# Patient Record
Sex: FEMALE | Race: WHITE | NOT HISPANIC OR LATINO | URBAN - METROPOLITAN AREA
[De-identification: names, ages, dates, MRNs, and addresses within clinical notes are randomized per-mention and may not be internally consistent; named-entity substitution may affect disease eponyms.]

---

## 2017-04-26 ENCOUNTER — APPOINTMENT (RX ONLY)
Dept: URBAN - METROPOLITAN AREA CLINIC 23 | Facility: CLINIC | Age: 82
Setting detail: DERMATOLOGY
End: 2017-04-26

## 2017-04-26 DIAGNOSIS — L82.0 INFLAMED SEBORRHEIC KERATOSIS: ICD-10-CM

## 2017-04-26 DIAGNOSIS — L81.4 OTHER MELANIN HYPERPIGMENTATION: ICD-10-CM

## 2017-04-26 DIAGNOSIS — Z85.828 PERSONAL HISTORY OF OTHER MALIGNANT NEOPLASM OF SKIN: ICD-10-CM

## 2017-04-26 PROBLEM — D04.39 CARCINOMA IN SITU OF SKIN OF OTHER PARTS OF FACE: Status: ACTIVE | Noted: 2017-04-26

## 2017-04-26 PROBLEM — M12.9 ARTHROPATHY, UNSPECIFIED: Status: ACTIVE | Noted: 2017-04-26

## 2017-04-26 PROCEDURE — ? LIQUID NITROGEN

## 2017-04-26 PROCEDURE — 17110 DESTRUCTION B9 LES UP TO 14: CPT

## 2017-04-26 PROCEDURE — 99214 OFFICE O/P EST MOD 30 MIN: CPT | Mod: 25

## 2017-04-26 PROCEDURE — ? COUNSELING

## 2017-04-26 PROCEDURE — ? OTHER

## 2017-04-26 ASSESSMENT — LOCATION ZONE DERM
LOCATION ZONE: NECK
LOCATION ZONE: LEG
LOCATION ZONE: ARM
LOCATION ZONE: FACE
LOCATION ZONE: TRUNK

## 2017-04-26 ASSESSMENT — LOCATION DETAILED DESCRIPTION DERM
LOCATION DETAILED: LEFT DISTAL DORSAL FOREARM
LOCATION DETAILED: RIGHT PROXIMAL PRETIBIAL REGION
LOCATION DETAILED: RIGHT DISTAL RADIAL DORSAL FOREARM
LOCATION DETAILED: LEFT INFERIOR MEDIAL MALAR CHEEK
LOCATION DETAILED: RIGHT LATERAL TRAPEZIAL NECK
LOCATION DETAILED: LEFT DISTAL PRETIBIAL REGION
LOCATION DETAILED: SUPERIOR THORACIC SPINE

## 2017-04-26 ASSESSMENT — LOCATION SIMPLE DESCRIPTION DERM
LOCATION SIMPLE: LEFT CHEEK
LOCATION SIMPLE: RIGHT PRETIBIAL REGION
LOCATION SIMPLE: UPPER BACK
LOCATION SIMPLE: RIGHT FOREARM
LOCATION SIMPLE: LEFT FOREARM
LOCATION SIMPLE: LEFT PRETIBIAL REGION
LOCATION SIMPLE: POSTERIOR NECK

## 2017-04-26 NOTE — PROCEDURE: LIQUID NITROGEN
Medical Necessity Clause: This procedure was medically necessary because the lesions that were treated were:irritated
Post-Care Instructions: I reviewed with the patient in detail post-care instructions. Patient is to wear sunprotection, and avoid picking at any of the treated lesions. Pt may apply Vaseline to crusted or scabbing areas.
Add 52 Modifier (Optional): no
Detail Level: Detailed
Medical Necessity Information: It is in your best interest to select a reason for this procedure from the list below. All of these items fulfill various CMS LCD requirements except the new and changing color options.
Consent: The patient's consent was obtained including but not limited to risks of crusting, scabbing, blistering, scarring, darker or lighter pigmentary change, recurrence, incomplete removal and infection.

## 2017-04-26 NOTE — PROCEDURE: OTHER
Detail Level: Simple
Other (Free Text): Patient treated with Zyclara due to unclear margins after removal by Dr. Khan. No clinical residual is seen at today's visit.
Note Text (......Xxx Chief Complaint.): This diagnosis correlates with the

## 2017-11-08 ENCOUNTER — APPOINTMENT (RX ONLY)
Dept: URBAN - METROPOLITAN AREA CLINIC 23 | Facility: CLINIC | Age: 82
Setting detail: DERMATOLOGY
End: 2017-11-08

## 2017-11-08 DIAGNOSIS — L57.0 ACTINIC KERATOSIS: ICD-10-CM

## 2017-11-08 DIAGNOSIS — L81.4 OTHER MELANIN HYPERPIGMENTATION: ICD-10-CM

## 2017-11-08 DIAGNOSIS — L82.1 OTHER SEBORRHEIC KERATOSIS: ICD-10-CM

## 2017-11-08 DIAGNOSIS — Z85.828 PERSONAL HISTORY OF OTHER MALIGNANT NEOPLASM OF SKIN: ICD-10-CM

## 2017-11-08 PROBLEM — M12.9 ARTHROPATHY, UNSPECIFIED: Status: ACTIVE | Noted: 2017-11-08

## 2017-11-08 PROCEDURE — ? LIQUID NITROGEN

## 2017-11-08 PROCEDURE — ? COUNSELING

## 2017-11-08 PROCEDURE — 17003 DESTRUCT PREMALG LES 2-14: CPT

## 2017-11-08 PROCEDURE — 17000 DESTRUCT PREMALG LESION: CPT

## 2017-11-08 PROCEDURE — 99214 OFFICE O/P EST MOD 30 MIN: CPT | Mod: 25

## 2017-11-08 ASSESSMENT — LOCATION SIMPLE DESCRIPTION DERM
LOCATION SIMPLE: NOSE
LOCATION SIMPLE: SUPERIOR FOREHEAD
LOCATION SIMPLE: LEFT CHEEK
LOCATION SIMPLE: RIGHT SHOULDER
LOCATION SIMPLE: RIGHT LOWER BACK
LOCATION SIMPLE: POSTERIOR NECK
LOCATION SIMPLE: RIGHT CALF
LOCATION SIMPLE: RIGHT CHEEK
LOCATION SIMPLE: RIGHT FOREHEAD
LOCATION SIMPLE: ABDOMEN
LOCATION SIMPLE: LEFT SHOULDER
LOCATION SIMPLE: LEFT HAND
LOCATION SIMPLE: LEFT CALF

## 2017-11-08 ASSESSMENT — LOCATION DETAILED DESCRIPTION DERM
LOCATION DETAILED: NASAL DORSUM
LOCATION DETAILED: LEFT PROXIMAL CALF
LOCATION DETAILED: RIGHT LATERAL FOREHEAD
LOCATION DETAILED: NASAL SUPRATIP
LOCATION DETAILED: RIGHT LATERAL BUCCAL CHEEK
LOCATION DETAILED: EPIGASTRIC SKIN
LOCATION DETAILED: RIGHT SUPERIOR MEDIAL MIDBACK
LOCATION DETAILED: LEFT RADIAL DORSAL HAND
LOCATION DETAILED: LEFT SUPERIOR CENTRAL MALAR CHEEK
LOCATION DETAILED: RIGHT LATERAL TRAPEZIAL NECK
LOCATION DETAILED: LEFT POSTERIOR SHOULDER
LOCATION DETAILED: RIGHT POSTERIOR SHOULDER
LOCATION DETAILED: SUPERIOR MID FOREHEAD
LOCATION DETAILED: RIGHT PROXIMAL CALF

## 2017-11-08 ASSESSMENT — LOCATION ZONE DERM
LOCATION ZONE: TRUNK
LOCATION ZONE: HAND
LOCATION ZONE: ARM
LOCATION ZONE: NECK
LOCATION ZONE: LEG
LOCATION ZONE: FACE
LOCATION ZONE: NOSE

## 2018-11-26 ENCOUNTER — APPOINTMENT (RX ONLY)
Dept: URBAN - METROPOLITAN AREA CLINIC 23 | Facility: CLINIC | Age: 83
Setting detail: DERMATOLOGY
End: 2018-11-26

## 2018-11-26 DIAGNOSIS — D18.0 HEMANGIOMA: ICD-10-CM

## 2018-11-26 DIAGNOSIS — L81.4 OTHER MELANIN HYPERPIGMENTATION: ICD-10-CM

## 2018-11-26 DIAGNOSIS — Z85.828 PERSONAL HISTORY OF OTHER MALIGNANT NEOPLASM OF SKIN: ICD-10-CM

## 2018-11-26 DIAGNOSIS — L82.1 OTHER SEBORRHEIC KERATOSIS: ICD-10-CM

## 2018-11-26 PROBLEM — L57.0 ACTINIC KERATOSIS: Status: ACTIVE | Noted: 2018-11-26

## 2018-11-26 PROBLEM — D18.01 HEMANGIOMA OF SKIN AND SUBCUTANEOUS TISSUE: Status: ACTIVE | Noted: 2018-11-26

## 2018-11-26 PROCEDURE — 99213 OFFICE O/P EST LOW 20 MIN: CPT

## 2018-11-26 PROCEDURE — ? COUNSELING

## 2018-11-26 ASSESSMENT — LOCATION DETAILED DESCRIPTION DERM
LOCATION DETAILED: LEFT RIB CAGE
LOCATION DETAILED: RIGHT LATERAL TRAPEZIAL NECK
LOCATION DETAILED: SUPERIOR LUMBAR SPINE
LOCATION DETAILED: RIGHT PROXIMAL CALF
LOCATION DETAILED: LEFT POSTERIOR SHOULDER
LOCATION DETAILED: RIGHT POSTERIOR SHOULDER
LOCATION DETAILED: LEFT PROXIMAL CALF
LOCATION DETAILED: RIGHT MID-UPPER BACK
LOCATION DETAILED: SUBXIPHOID
LOCATION DETAILED: LEFT SUPERIOR CENTRAL MALAR CHEEK
LOCATION DETAILED: LEFT ANTERIOR DISTAL THIGH

## 2018-11-26 ASSESSMENT — LOCATION SIMPLE DESCRIPTION DERM
LOCATION SIMPLE: LEFT CALF
LOCATION SIMPLE: LEFT SHOULDER
LOCATION SIMPLE: POSTERIOR NECK
LOCATION SIMPLE: RIGHT CALF
LOCATION SIMPLE: ABDOMEN
LOCATION SIMPLE: RIGHT SHOULDER
LOCATION SIMPLE: LEFT CHEEK
LOCATION SIMPLE: LEFT THIGH
LOCATION SIMPLE: LOWER BACK
LOCATION SIMPLE: RIGHT UPPER BACK

## 2018-11-26 ASSESSMENT — LOCATION ZONE DERM
LOCATION ZONE: LEG
LOCATION ZONE: TRUNK
LOCATION ZONE: FACE
LOCATION ZONE: ARM
LOCATION ZONE: NECK

## 2019-11-25 ENCOUNTER — APPOINTMENT (RX ONLY)
Dept: URBAN - METROPOLITAN AREA CLINIC 23 | Facility: CLINIC | Age: 84
Setting detail: DERMATOLOGY
End: 2019-11-25

## 2019-11-25 DIAGNOSIS — L81.4 OTHER MELANIN HYPERPIGMENTATION: ICD-10-CM

## 2019-11-25 DIAGNOSIS — L82.1 OTHER SEBORRHEIC KERATOSIS: ICD-10-CM

## 2019-11-25 DIAGNOSIS — D18.0 HEMANGIOMA: ICD-10-CM

## 2019-11-25 DIAGNOSIS — L82.0 INFLAMED SEBORRHEIC KERATOSIS: ICD-10-CM

## 2019-11-25 DIAGNOSIS — Z85.828 PERSONAL HISTORY OF OTHER MALIGNANT NEOPLASM OF SKIN: ICD-10-CM

## 2019-11-25 PROBLEM — M12.9 ARTHROPATHY, UNSPECIFIED: Status: ACTIVE | Noted: 2019-11-25

## 2019-11-25 PROBLEM — D18.01 HEMANGIOMA OF SKIN AND SUBCUTANEOUS TISSUE: Status: ACTIVE | Noted: 2019-11-25

## 2019-11-25 PROCEDURE — 99214 OFFICE O/P EST MOD 30 MIN: CPT | Mod: 25

## 2019-11-25 PROCEDURE — 17110 DESTRUCTION B9 LES UP TO 14: CPT

## 2019-11-25 PROCEDURE — ? COUNSELING

## 2019-11-25 PROCEDURE — ? LIQUID NITROGEN

## 2019-11-25 ASSESSMENT — LOCATION DETAILED DESCRIPTION DERM
LOCATION DETAILED: EPIGASTRIC SKIN
LOCATION DETAILED: RIGHT SUPERIOR UPPER BACK
LOCATION DETAILED: LEFT POSTERIOR SHOULDER
LOCATION DETAILED: INFERIOR THORACIC SPINE
LOCATION DETAILED: LEFT INFERIOR MEDIAL UPPER BACK
LOCATION DETAILED: RIGHT LATERAL TRAPEZIAL NECK
LOCATION DETAILED: PERIUMBILICAL SKIN
LOCATION DETAILED: LEFT ANTERIOR DISTAL THIGH
LOCATION DETAILED: RIGHT PROXIMAL PRETIBIAL REGION
LOCATION DETAILED: LEFT SUPERIOR CENTRAL MALAR CHEEK
LOCATION DETAILED: LEFT PROXIMAL PRETIBIAL REGION
LOCATION DETAILED: RIGHT POSTERIOR SHOULDER

## 2019-11-25 ASSESSMENT — LOCATION ZONE DERM
LOCATION ZONE: ARM
LOCATION ZONE: LEG
LOCATION ZONE: TRUNK
LOCATION ZONE: NECK
LOCATION ZONE: FACE

## 2019-11-25 ASSESSMENT — LOCATION SIMPLE DESCRIPTION DERM
LOCATION SIMPLE: LEFT THIGH
LOCATION SIMPLE: LEFT UPPER BACK
LOCATION SIMPLE: LEFT CHEEK
LOCATION SIMPLE: LEFT SHOULDER
LOCATION SIMPLE: UPPER BACK
LOCATION SIMPLE: ABDOMEN
LOCATION SIMPLE: LEFT PRETIBIAL REGION
LOCATION SIMPLE: POSTERIOR NECK
LOCATION SIMPLE: RIGHT SHOULDER
LOCATION SIMPLE: RIGHT UPPER BACK
LOCATION SIMPLE: RIGHT PRETIBIAL REGION

## 2019-11-25 NOTE — PROCEDURE: LIQUID NITROGEN
Detail Level: Detailed
Medical Necessity Clause: This procedure was medically necessary because the lesions that were treated were:irritated
Render Post-Care Instructions In Note?: no
Post-Care Instructions: I reviewed with the patient in detail post-care instructions. Patient is to wear sunprotection, and avoid picking at any of the treated lesions. Pt may apply Vaseline to crusted or scabbing areas.
Consent: The patient's consent was obtained including but not limited to risks of crusting, scabbing, blistering, scarring, darker or lighter pigmentary change, recurrence, incomplete removal and infection.
Medical Necessity Information: It is in your best interest to select a reason for this procedure from the list below. All of these items fulfill various CMS LCD requirements except the new and changing color options.

## 2020-12-02 ENCOUNTER — APPOINTMENT (RX ONLY)
Dept: URBAN - METROPOLITAN AREA CLINIC 24 | Facility: CLINIC | Age: 85
Setting detail: DERMATOLOGY
End: 2020-12-02

## 2020-12-02 ENCOUNTER — APPOINTMENT (RX ONLY)
Dept: URBAN - METROPOLITAN AREA CLINIC 23 | Facility: CLINIC | Age: 85
Setting detail: DERMATOLOGY
End: 2020-12-02

## 2020-12-02 DIAGNOSIS — L72.8 OTHER FOLLICULAR CYSTS OF THE SKIN AND SUBCUTANEOUS TISSUE: ICD-10-CM

## 2020-12-02 DIAGNOSIS — L82.1 OTHER SEBORRHEIC KERATOSIS: ICD-10-CM

## 2020-12-02 DIAGNOSIS — Z85.828 PERSONAL HISTORY OF OTHER MALIGNANT NEOPLASM OF SKIN: ICD-10-CM

## 2020-12-02 DIAGNOSIS — D485 NEOPLASM OF UNCERTAIN BEHAVIOR OF SKIN: ICD-10-CM

## 2020-12-02 DIAGNOSIS — L81.4 OTHER MELANIN HYPERPIGMENTATION: ICD-10-CM

## 2020-12-02 DIAGNOSIS — D18.0 HEMANGIOMA: ICD-10-CM

## 2020-12-02 PROBLEM — E78.5 HYPERLIPIDEMIA, UNSPECIFIED: Status: ACTIVE | Noted: 2020-12-02

## 2020-12-02 PROBLEM — D48.5 NEOPLASM OF UNCERTAIN BEHAVIOR OF SKIN: Status: ACTIVE | Noted: 2020-12-02

## 2020-12-02 PROBLEM — M12.9 ARTHROPATHY, UNSPECIFIED: Status: ACTIVE | Noted: 2020-12-02

## 2020-12-02 PROBLEM — D18.01 HEMANGIOMA OF SKIN AND SUBCUTANEOUS TISSUE: Status: ACTIVE | Noted: 2020-12-02

## 2020-12-02 PROCEDURE — ? BIOPSY BY SHAVE METHOD

## 2020-12-02 PROCEDURE — 99214 OFFICE O/P EST MOD 30 MIN: CPT | Mod: 25

## 2020-12-02 PROCEDURE — 11102 TANGNTL BX SKIN SINGLE LES: CPT

## 2020-12-02 PROCEDURE — ? COUNSELING

## 2020-12-02 PROCEDURE — ? OTHER

## 2020-12-02 ASSESSMENT — LOCATION ZONE DERM
LOCATION ZONE: TRUNK
LOCATION ZONE: FACE
LOCATION ZONE: LEG
LOCATION ZONE: NECK
LOCATION ZONE: ARM

## 2020-12-02 ASSESSMENT — LOCATION DETAILED DESCRIPTION DERM
LOCATION DETAILED: RIGHT DISTAL PRETIBIAL REGION
LOCATION DETAILED: RIGHT DISTAL DORSAL FOREARM
LOCATION DETAILED: EPIGASTRIC SKIN
LOCATION DETAILED: LEFT POSTERIOR SHOULDER
LOCATION DETAILED: LEFT INFERIOR MEDIAL UPPER BACK
LOCATION DETAILED: RIGHT POSTERIOR SHOULDER
LOCATION DETAILED: RIGHT LATERAL TRAPEZIAL NECK
LOCATION DETAILED: LEFT SUPERIOR CENTRAL MALAR CHEEK
LOCATION DETAILED: RIGHT PROXIMAL PRETIBIAL REGION
LOCATION DETAILED: LEFT CENTRAL LATERAL NECK
LOCATION DETAILED: LEFT DISTAL DORSAL FOREARM
LOCATION DETAILED: PERIUMBILICAL SKIN
LOCATION DETAILED: LEFT PROXIMAL PRETIBIAL REGION
LOCATION DETAILED: LEFT ANTERIOR DISTAL THIGH
LOCATION DETAILED: INFERIOR THORACIC SPINE

## 2020-12-02 ASSESSMENT — LOCATION SIMPLE DESCRIPTION DERM
LOCATION SIMPLE: POSTERIOR NECK
LOCATION SIMPLE: RIGHT PRETIBIAL REGION
LOCATION SIMPLE: LEFT SHOULDER
LOCATION SIMPLE: LEFT CHEEK
LOCATION SIMPLE: LEFT FOREARM
LOCATION SIMPLE: LEFT THIGH
LOCATION SIMPLE: LEFT UPPER BACK
LOCATION SIMPLE: LEFT PRETIBIAL REGION
LOCATION SIMPLE: RIGHT FOREARM
LOCATION SIMPLE: UPPER BACK
LOCATION SIMPLE: NECK
LOCATION SIMPLE: RIGHT SHOULDER
LOCATION SIMPLE: ABDOMEN

## 2020-12-02 NOTE — PROCEDURE: OTHER
Other (Free Text): Recommended Obagi hydroquinone products to fade spots on arms
Detail Level: Simple
Note Text (......Xxx Chief Complaint.): This diagnosis correlates with the

## 2020-12-02 NOTE — PROCEDURE: BIOPSY BY SHAVE METHOD
Information: Selecting Yes will display possible errors in your note based on the variables you have selected. This validation is only offered as a suggestion for you. PLEASE NOTE THAT THE VALIDATION TEXT WILL BE REMOVED WHEN YOU FINALIZE YOUR NOTE. IF YOU WANT TO FAX A PRELIMINARY NOTE YOU WILL NEED TO TOGGLE THIS TO 'NO' IF YOU DO NOT WANT IT IN YOUR FAXED NOTE.
Destruction After The Procedure: No
Was A Bandage Applied: Yes
Cryotherapy Text: The wound bed was treated with cryotherapy after the biopsy was performed.
Hemostasis: Aluminum Chloride
Biopsy Method: Dermablade
Billing Type: Third-Party Bill
Biopsy Type: H and E
Type Of Destruction Used: Curettage
Anesthesia Volume In Cc: 0.3
Additional Anesthesia Volume In Cc (Will Not Render If 0): 0
Silver Nitrate Text: The wound bed was treated with silver nitrate after the biopsy was performed.
Dressing: bandage
Accession #: [id number]
Electrodesiccation And Curettage Text: The wound bed was treated with electrodesiccation and curettage after the biopsy was performed.
Detail Level: Detailed
Post-care instructions were reviewed in detail and written instructions are provided. Patient is to keep the biopsy site dry overnight, and then apply bacitracin twice daily until healed. Patient may apply hydrogen peroxide soaks to remove any crusting.
Depth Of Biopsy: dermis
Notification Instructions: Patient will be notified of biopsy results. However, patient instructed to call the office if not contacted within 2 weeks.
Electrodesiccation Text: The wound bed was treated with electrodesiccation after the biopsy was performed.
Consent: Written consent was obtained and risks were reviewed including but not limited to scarring, infection, bleeding, scabbing, incomplete removal, and allergy to anesthesia.
Wound Care: Vaseline
Anesthesia Type: 1% lidocaine with epinephrine

## 2022-06-13 NOTE — PROCEDURE: MIPS QUALITY
Pt. Accepted to [facility]   [id number] CV-ICU  [phone]  
Detail Level: Detailed
Quality 110: Preventive Care And Screening: Influenza Immunization: Influenza Immunization previously received during influenza season
Quality 111:Pneumonia Vaccination Status For Older Adults: Pneumococcal Vaccination Previously Received
Quality 130: Documentation Of Current Medications In The Medical Record: Current Medications Documented

## 2023-10-12 ENCOUNTER — TELEPHONE (OUTPATIENT)
Dept: ORTHOPEDIC SURGERY | Age: 88
End: 2023-10-12

## 2023-10-12 NOTE — TELEPHONE ENCOUNTER
[address] at [facility] called wanting to schedule pt with Dr. Calixto Moran for FU. In chart pt has not been seen by [facility]. Pt was seen at [facility] for a Humeral Fracture 9/19/2023. Facility was advised they would need to FU w/ [facility] . No Appt made.  [address]

## 2023-10-17 ENCOUNTER — TELEPHONE (OUTPATIENT)
Dept: ORTHOPEDIC SURGERY | Age: 88
End: 2023-10-17

## 2023-10-17 NOTE — TELEPHONE ENCOUNTER
Pt's Daughter (Alba)call today  requesting appt for pt to FU w/Dr. Fabian Cosme. Originally on 10/12 when the [place] facility called they stated pt had surgery  @ [facility] on 9/19/2023 so they were instructed by me that they needed top FU with the Surgeon @ [facility] Per Office Policy. However today when the daughter called and we finally received the records the pt did NOT have surgery at [facility] she was only seen at the ED. Pt needs appt to be seen for a Left-Proximal Humerus Fx  treated Non OP injury 9/19/2023. Pt was given appt for 10/19/2023 @ 9:30am w/ Dr. Fabian Cosme. Pt's daughter aware of appt and location. Arays from Written have been requested to be pushed to Pacs. Records scanned into chart.  [id number]

## 2023-10-19 ENCOUNTER — OFFICE VISIT (OUTPATIENT)
Dept: ORTHOPEDIC SURGERY | Age: 88
End: 2023-10-19

## 2023-10-19 DIAGNOSIS — M25.532 LEFT WRIST PAIN: ICD-10-CM

## 2023-10-19 DIAGNOSIS — S42.202A CLOSED FRACTURE OF PROXIMAL END OF LEFT HUMERUS, UNSPECIFIED FRACTURE MORPHOLOGY, INITIAL ENCOUNTER: Primary | ICD-10-CM

## 2023-10-19 NOTE — PROGRESS NOTES
Progress Note    Patient: Fletcher Caceres MRN: [id number]  SSN: [id number]    YOB: 1935  Age: 80 y.o. Sex: female        10/19/2023      Subjective:     Patient is here today in follow-up for a left proximal humerus fracture. She originally injured this week around September 19 so she is about 4 weeks out from this. She just fell. She is here with family member today. The patient herself cannot give a lot of history. She seems that she is doing pretty well with the sling on this she is also complaining of some wrist pain as well however    Objective: There were no vitals filed for this visit. Physical Exam:     Skin - no open wounds or pressure sores  Motor and sensory function intact in LEFT UPPER extremity  Pulses palpable in LEFT UPPER extremity   I am able to do some very gentle passive range of motion of the left shoulder with little discomfort. I examined her left wrist.  She does not have any swelling or obvious tenderness around her left wrist  XRAY FINDINGS:  [id number] left proximal humerus fracture, findings-true AP and scapular Y views of the left shoulder shows the left proximal humerus fracture is well aligned on both the AP and lateral projection. There is already the evidence of some early callus formation at the primary fracture lines. She does have some mild to moderate pseudosubluxation of the humeral head which is to be expected. Impression #healing well aligned left proximal humerus fracture    Indications-left wrist pain after fall, findings-3 views of the left wrist shows no evidence of any acute fractures or dislocations. She has some mild osteopenia throughout the bones of her left wrist.  She also has a very small ossicle off the left ulnar styloid but this appears to be something that is longstanding.   She has only mild degenerative changes throughout the bones of her left wrist.  Impression-negative for fracture left wrist

## 2023-11-10 ENCOUNTER — APPOINTMENT (OUTPATIENT)
Dept: CT IMAGING | Age: 88
End: 2023-11-10
Payer: MEDICARE

## 2023-11-10 ENCOUNTER — HOSPITAL ENCOUNTER (EMERGENCY)
Age: 88
Discharge: HOME | End: 2023-11-10
Attending: EMERGENCY MEDICINE
Payer: MEDICARE

## 2023-11-10 VITALS
OXYGEN SATURATION: 98 % | BODY MASS INDEX: 21.6 KG/M2 | HEIGHT: 60 IN | HEART RATE: 65 BPM | DIASTOLIC BLOOD PRESSURE: 48 MMHG | TEMPERATURE: 98 F | SYSTOLIC BLOOD PRESSURE: 139 MMHG | RESPIRATION RATE: 18 BRPM | WEIGHT: 110 LBS

## 2023-11-10 DIAGNOSIS — S00.83XA CONTUSION OF FACE, INITIAL ENCOUNTER: Primary | ICD-10-CM

## 2023-11-10 PROCEDURE — 70450 CT HEAD/BRAIN W/O DYE: CPT

## 2023-11-10 PROCEDURE — 99284 EMERGENCY DEPT VISIT MOD MDM: CPT

## 2023-11-10 RX ORDER — ACETAMINOPHEN 500 MG
500 TABLET ORAL EVERY 8 HOURS PRN
COMMUNITY

## 2023-11-10 RX ORDER — MEMANTINE HYDROCHLORIDE 10 MG/1
10 TABLET ORAL 2 TIMES DAILY
COMMUNITY

## 2023-11-10 ASSESSMENT — PAIN - FUNCTIONAL ASSESSMENT: PAIN_FUNCTIONAL_ASSESSMENT: NONE - DENIES PAIN

## 2023-11-10 NOTE — ED TRIAGE NOTES
Pt has unwtiness fall , pt has abrasion to right side of head, pt confused , at her baseline per ems, pt is from memory care unit at the [facility]

## 2023-11-16 ENCOUNTER — OFFICE VISIT (OUTPATIENT)
Dept: ORTHOPEDIC SURGERY | Age: 88
End: 2023-11-16

## 2023-11-16 DIAGNOSIS — S42.202A CLOSED FRACTURE OF PROXIMAL END OF LEFT HUMERUS, UNSPECIFIED FRACTURE MORPHOLOGY, INITIAL ENCOUNTER: Primary | ICD-10-CM

## 2023-11-16 NOTE — PROGRESS NOTES
Progress Note    Patient: Fletcher Caceres MRN: [id number]  SSN: [id number]    YOB: 1935  Age: 80 y.o. Sex: female        11/16/2023      Subjective:     Patient is here today in follow-up for left proximal humerus fracture. She seems to be doing really well. Her family is here with her says that she feels better. Unfortunately she did have another fall and was seen in the emergency room and had a scan of her head she can hit her right elbow and her right hip at all that checked out pretty well    Objective: There were no vitals filed for this visit. Physical Exam:     Skin - no open wounds or pressure sores  Motor and sensory function intact in LEFT UPPER extremity  Pulses palpable in LEFT UPPER extremity   And actively elevate to about 120 degrees in the office for me today  XRAY FINDINGS:  Patient-follow-up left proximal humerus fracture, findings-true AP and scapular Y views of the left shoulder shows a left proximal humerus fracture shows significant evidence of callus formation since her last x-rays. There does appear to be bridging callus now. The overall alignment is excellent. Impression is healing well aligned left proximal humerus fracture    Assessment:     Healing well aligned left proximal humerus fracture now 8 weeks out    Plan:     She really is doing very well as far as regaining her motion. As far as therapy is concerned I think she can continue doing full active and passive range of motion and full strengthening now with her left shoulder. I think she can use this to weight-bear if she needs to use a walker. In short she really does not have any restriction now. I have just encouraged her that she will continue to heal and get more solid over time but I think it would be reasonable just to allow her to follow-up on as-needed basis.   If she has any issues I be happy to see her as long as she is doing well she does not need to return    Signed By: Marie Lopez

## 2023-12-30 ENCOUNTER — APPOINTMENT (OUTPATIENT)
Dept: CT IMAGING | Age: 88
End: 2023-12-30
Payer: MEDICARE

## 2023-12-30 ENCOUNTER — HOSPITAL ENCOUNTER (EMERGENCY)
Age: 88
Discharge: HOME | End: 2023-12-30
Attending: GENERAL PRACTICE
Payer: MEDICARE

## 2023-12-30 VITALS
BODY MASS INDEX: 21.6 KG/M2 | OXYGEN SATURATION: 98 % | RESPIRATION RATE: 18 BRPM | TEMPERATURE: 98.1 F | DIASTOLIC BLOOD PRESSURE: 92 MMHG | WEIGHT: 110 LBS | HEIGHT: 60 IN | SYSTOLIC BLOOD PRESSURE: 130 MMHG | HEART RATE: 65 BPM

## 2023-12-30 DIAGNOSIS — S09.90XA INJURY OF HEAD, INITIAL ENCOUNTER: Primary | ICD-10-CM
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
## 2023-12-30 DIAGNOSIS — U07.1 COVID-19 VIRUS INFECTION: ICD-10-CM

## 2023-12-30 LAB
ALBUMIN SERPL-MCNC: 3.4 G/DL (ref 3.2–4.6)
ALBUMIN/GLOB SERPL: 0.8 (ref 0.4–1.6)
ALP SERPL-CCNC: 81 U/L (ref 50–136)
ALT SERPL-CCNC: 35 U/L (ref 12–65)
ANION GAP SERPL CALC-SCNC: 6 MMOL/L (ref 2–11)
AST SERPL-CCNC: 33 U/L (ref 15–37)
BASOPHILS # BLD: 0 K/UL (ref 0–0.2)
BASOPHILS NFR BLD: 0 % (ref 0–2)
BILIRUB SERPL-MCNC: 0.4 MG/DL (ref 0.2–1.1)
BUN SERPL-MCNC: 18 MG/DL (ref 8–23)
CALCIUM SERPL-MCNC: 9.3 MG/DL (ref 8.3–10.4)
CHLORIDE SERPL-SCNC: 109 MMOL/L (ref 103–113)
CO2 SERPL-SCNC: 22 MMOL/L (ref 21–32)
CREAT SERPL-MCNC: 0.8 MG/DL (ref 0.6–1)
DIFFERENTIAL METHOD BLD: ABNORMAL
EOSINOPHIL # BLD: 0 K/UL (ref 0–0.8)
EOSINOPHIL NFR BLD: 1 % (ref 0.5–7.8)
ERYTHROCYTE [DISTWIDTH] IN BLOOD BY AUTOMATED COUNT: 13.9 % (ref 11.9–14.6)
GLOBULIN SER CALC-MCNC: 4.4 G/DL (ref 2.8–4.5)
GLUCOSE SERPL-MCNC: 94 MG/DL (ref 65–100)
HCT VFR BLD AUTO: 42.3 % (ref 35.8–46.3)
HGB BLD-MCNC: 13.3 G/DL (ref 11.7–15.4)
IMM GRANULOCYTES # BLD AUTO: 0 K/UL (ref 0–0.5)
IMM GRANULOCYTES NFR BLD AUTO: 0 % (ref 0–5)
LYMPHOCYTES # BLD: 1.1 K/UL (ref 0.5–4.6)
LYMPHOCYTES NFR BLD: 16 % (ref 13–44)
MCH RBC QN AUTO: 30.1 PG (ref 26.1–32.9)
MCHC RBC AUTO-ENTMCNC: 31.4 G/DL (ref 31.4–35)
MCV RBC AUTO: 95.7 FL (ref 82–102)
MONOCYTES # BLD: 0.5 K/UL (ref 0.1–1.3)
MONOCYTES NFR BLD: 8 % (ref 4–12)
NEUTS SEG # BLD: 4.9 K/UL (ref 1.7–8.2)
NEUTS SEG NFR BLD: 75 % (ref 43–78)
NRBC # BLD: 0 K/UL (ref 0–0.2)
PLATELET # BLD AUTO: 203 K/UL (ref 150–450)
PMV BLD AUTO: 9.3 FL (ref 9.4–12.3)
POTASSIUM SERPL-SCNC: 4.2 MMOL/L (ref 3.5–5.1)
PROT SERPL-MCNC: 7.8 G/DL (ref 6.3–8.2)
RBC # BLD AUTO: 4.42 M/UL (ref 4.05–5.2)
SODIUM SERPL-SCNC: 137 MMOL/L (ref 136–146)
WBC # BLD AUTO: 6.6 K/UL (ref 4.3–11.1)

## 2023-12-30 PROCEDURE — 80053 COMPREHEN METABOLIC PANEL: CPT

## 2023-12-30 PROCEDURE — 70450 CT HEAD/BRAIN W/O DYE: CPT

## 2023-12-30 PROCEDURE — 99284 EMERGENCY DEPT VISIT MOD MDM: CPT

## 2023-12-30 PROCEDURE — 85025 COMPLETE CBC W/AUTO DIFF WBC: CPT

## 2023-12-30 ASSESSMENT — PAIN - FUNCTIONAL ASSESSMENT: PAIN_FUNCTIONAL_ASSESSMENT: NONE - DENIES PAIN

## 2023-12-30 NOTE — ED PROVIDER NOTES
[facility]    TOTAL HIP ARTHROPLASTY  4-13, 7-13    left THR, right THR        Social History     Socioeconomic History    Marital status:    Tobacco Use    Smoking status: Never    Smokeless tobacco: Never   Substance and Sexual Activity    Alcohol use: No    Drug use: No        Previous Medications    ACETAMINOPHEN (TYLENOL) 500 MG TABLET    Take 1 tablet by mouth every 8 hours as needed for Pain    AMLODIPINE (NORVASC) 5 MG TABLET    Take 2 tablets by mouth daily    ASCORBIC ACID (VITAMIN C) 500 MG TABLET    Take by mouth    CALCIUM CARBONATE-VITAMIN D (CALCIUM-VITAMIN D) 600-125 MG-UNIT TABS    Take by mouth daily    COENZYME Q10 10 MG CAPS    Take by mouth    CYANOCOBALAMIN 100 MCG TABLET    Take 100 mcg by mouth daily    CYCLOBENZAPRINE (FLEXERIL) 5 MG TABLET    Take 5 mg by mouth daily    IBUPROFEN (ADVIL;MOTRIN) 200 MG TABLET    Take by mouth as needed    MEMANTINE (NAMENDA) 10 MG TABLET    Take 1 tablet by mouth 2 times daily    OMEPRAZOLE (PRILOSEC) 20 MG DELAYED RELEASE CAPSULE    Take 20 mg by mouth daily    SERTRALINE (ZOLOFT) 50 MG TABLET    Take 1 tablet by mouth daily    ZOLPIDEM (AMBIEN) 5 MG TABLET    Take 5 mg by mouth. Results for orders placed or performed during the hospital encounter of 12/30/23   CT Head W/O Contrast    Narrative    PROCEDURE: CT HEAD WITHOUT CONTRAST    CLINICAL HISTORY: Headache, trauma. COMPARISON: 11/10/2023. TECHNIQUE: Axial CT images of the head obtained without contrast.  Multiplanar  reconstructions performed. Dose reduction technique was used on this scan by utilizing automated exposure  control, adjustment of the mA and/or kV according to the patient size. DICOM  format image data available to non-affiliated external healthcare facilities or  entities on a secure, media free, reciprocally searchable basis with patient  authorization for at least a 12 month period after the study. FINDINGS:  Moderate cerebral atrophy.   Ventricles are

## 2023-12-30 NOTE — ED TRIAGE NOTES
Per ems called to [facility] at [facility] for fall. Unwitnessed fall around 2130 last night. Found on floor. States \"knot\"  on r parietal side of head. States slight r sided facial droop. Alert to self only. States baseline. Covid positive \"uknown dx\" no blood thinners.

## 2024-02-15 ENCOUNTER — APPOINTMENT (OUTPATIENT)
Dept: GENERAL RADIOLOGY | Age: 89
DRG: 082 | End: 2024-02-15
Payer: MEDICARE

## 2024-02-15 ENCOUNTER — HOSPITAL ENCOUNTER (INPATIENT)
Age: 89
LOS: 11 days | Discharge: NURSING, REHAB OR LONG-TERM CARE | DRG: 082 | End: 2024-02-26
Attending: EMERGENCY MEDICINE | Admitting: HOSPITALIST
Payer: MEDICARE

## 2024-02-15 ENCOUNTER — APPOINTMENT (OUTPATIENT)
Dept: CT IMAGING | Age: 89
DRG: 082 | End: 2024-02-15
Payer: MEDICARE

## 2024-02-15 DIAGNOSIS — N17.9 AKI (ACUTE KIDNEY INJURY) (HCC): ICD-10-CM

## 2024-02-15 DIAGNOSIS — R41.82 ALTERED MENTAL STATUS, UNSPECIFIED ALTERED MENTAL STATUS TYPE: Primary | ICD-10-CM

## 2024-02-15 DIAGNOSIS — N39.0 URINARY TRACT INFECTION WITHOUT HEMATURIA, SITE UNSPECIFIED: ICD-10-CM

## 2024-02-15 DIAGNOSIS — F03.B11 MODERATE DEMENTIA WITH AGITATION, UNSPECIFIED DEMENTIA TYPE (HCC): ICD-10-CM

## 2024-02-15 DIAGNOSIS — S06.5XAA SUBDURAL HEMATOMA (HCC): ICD-10-CM

## 2024-02-15 PROBLEM — F03.918 DEMENTIA WITH BEHAVIORAL DISTURBANCE (HCC): Status: ACTIVE | Noted: 2024-02-15

## 2024-02-15 LAB
ALBUMIN SERPL-MCNC: 3.2 G/DL (ref 3.2–4.6)
ALBUMIN/GLOB SERPL: 1.1 (ref 0.4–1.6)
ALP SERPL-CCNC: 66 U/L (ref 50–136)
ALT SERPL-CCNC: 18 U/L (ref 12–65)
ANION GAP SERPL CALC-SCNC: 3 MMOL/L (ref 2–11)
APPEARANCE UR: ABNORMAL
AST SERPL-CCNC: 16 U/L (ref 15–37)
BACTERIA URNS QL MICRO: ABNORMAL /HPF
BASOPHILS # BLD: 0 K/UL (ref 0–0.2)
BASOPHILS NFR BLD: 1 % (ref 0–2)
BILIRUB SERPL-MCNC: 0.2 MG/DL (ref 0.2–1.1)
BILIRUB UR QL: NEGATIVE
BUN SERPL-MCNC: 25 MG/DL (ref 8–23)
CALCIUM SERPL-MCNC: 8.7 MG/DL (ref 8.3–10.4)
CASTS URNS QL MICRO: ABNORMAL /LPF
CHLORIDE SERPL-SCNC: 114 MMOL/L (ref 103–113)
CO2 SERPL-SCNC: 28 MMOL/L (ref 21–32)
COLOR UR: ABNORMAL
CREAT SERPL-MCNC: 1.16 MG/DL (ref 0.6–1)
DIFFERENTIAL METHOD BLD: ABNORMAL
EOSINOPHIL # BLD: 0 K/UL (ref 0–0.8)
EOSINOPHIL NFR BLD: 1 % (ref 0.5–7.8)
EPI CELLS #/AREA URNS HPF: ABNORMAL /HPF
ERYTHROCYTE [DISTWIDTH] IN BLOOD BY AUTOMATED COUNT: 16.1 % (ref 11.9–14.6)
GLOBULIN SER CALC-MCNC: 3 G/DL (ref 2.8–4.5)
GLUCOSE SERPL-MCNC: 130 MG/DL (ref 65–100)
GLUCOSE UR STRIP.AUTO-MCNC: NEGATIVE MG/DL
HCT VFR BLD AUTO: 34.3 % (ref 35.8–46.3)
HGB BLD-MCNC: 10.7 G/DL (ref 11.7–15.4)
HGB UR QL STRIP: NEGATIVE
IMM GRANULOCYTES # BLD AUTO: 0 K/UL (ref 0–0.5)
IMM GRANULOCYTES NFR BLD AUTO: 0 % (ref 0–5)
KETONES UR QL STRIP.AUTO: 15 MG/DL
LACTATE SERPL-SCNC: 1.8 MMOL/L (ref 0.4–2)
LEUKOCYTE ESTERASE UR QL STRIP.AUTO: ABNORMAL
LYMPHOCYTES # BLD: 1.1 K/UL (ref 0.5–4.6)
LYMPHOCYTES NFR BLD: 29 % (ref 13–44)
MAGNESIUM SERPL-MCNC: 2.2 MG/DL (ref 1.8–2.4)
MCH RBC QN AUTO: 30.1 PG (ref 26.1–32.9)
MCHC RBC AUTO-ENTMCNC: 31.2 G/DL (ref 31.4–35)
MCV RBC AUTO: 96.6 FL (ref 82–102)
MONOCYTES # BLD: 0.5 K/UL (ref 0.1–1.3)
MONOCYTES NFR BLD: 13 % (ref 4–12)
MUCOUS THREADS URNS QL MICRO: ABNORMAL /LPF
NEUTS SEG # BLD: 2.1 K/UL (ref 1.7–8.2)
NEUTS SEG NFR BLD: 57 % (ref 43–78)
NITRITE UR QL STRIP.AUTO: NEGATIVE
NRBC # BLD: 0 K/UL (ref 0–0.2)
OTHER OBSERVATIONS: ABNORMAL
PH UR STRIP: 5.5 (ref 5–9)
PLATELET # BLD AUTO: 220 K/UL (ref 150–450)
PMV BLD AUTO: 9.9 FL (ref 9.4–12.3)
POTASSIUM SERPL-SCNC: 3.8 MMOL/L (ref 3.5–5.1)
PROCALCITONIN SERPL-MCNC: <0.05 NG/ML (ref 0–0.49)
PROT SERPL-MCNC: 6.2 G/DL (ref 6.3–8.2)
PROT UR STRIP-MCNC: ABNORMAL MG/DL
RBC # BLD AUTO: 3.55 M/UL (ref 4.05–5.2)
RBC #/AREA URNS HPF: ABNORMAL /HPF
SODIUM SERPL-SCNC: 145 MMOL/L (ref 136–146)
SP GR UR REFRACTOMETRY: 1.03 (ref 1–1.02)
TSH, 3RD GENERATION: 2.96 UIU/ML (ref 0.36–3.74)
UROBILINOGEN UR QL STRIP.AUTO: 1 EU/DL (ref 0.2–1)
VALPROATE SERPL-MCNC: 24 UG/ML (ref 50–100)
WBC # BLD AUTO: 3.7 K/UL (ref 4.3–11.1)
WBC URNS QL MICRO: >100 /HPF
YEAST URNS QL MICRO: ABNORMAL

## 2024-02-15 PROCEDURE — 2580000003 HC RX 258: Performed by: EMERGENCY MEDICINE

## 2024-02-15 PROCEDURE — 6370000000 HC RX 637 (ALT 250 FOR IP): Performed by: HOSPITALIST

## 2024-02-15 PROCEDURE — 96374 THER/PROPH/DIAG INJ IV PUSH: CPT

## 2024-02-15 PROCEDURE — 2580000003 HC RX 258: Performed by: HOSPITALIST

## 2024-02-15 PROCEDURE — 87086 URINE CULTURE/COLONY COUNT: CPT

## 2024-02-15 PROCEDURE — 70450 CT HEAD/BRAIN W/O DYE: CPT

## 2024-02-15 PROCEDURE — 84145 PROCALCITONIN (PCT): CPT

## 2024-02-15 PROCEDURE — 72125 CT NECK SPINE W/O DYE: CPT

## 2024-02-15 PROCEDURE — 93005 ELECTROCARDIOGRAM TRACING: CPT | Performed by: EMERGENCY MEDICINE

## 2024-02-15 PROCEDURE — 2000000000 HC ICU R&B

## 2024-02-15 PROCEDURE — A4216 STERILE WATER/SALINE, 10 ML: HCPCS | Performed by: EMERGENCY MEDICINE

## 2024-02-15 PROCEDURE — 84443 ASSAY THYROID STIM HORMONE: CPT

## 2024-02-15 PROCEDURE — 6360000002 HC RX W HCPCS: Performed by: HOSPITALIST

## 2024-02-15 PROCEDURE — 83605 ASSAY OF LACTIC ACID: CPT

## 2024-02-15 PROCEDURE — 71045 X-RAY EXAM CHEST 1 VIEW: CPT

## 2024-02-15 PROCEDURE — 80164 ASSAY DIPROPYLACETIC ACD TOT: CPT

## 2024-02-15 PROCEDURE — 81001 URINALYSIS AUTO W/SCOPE: CPT

## 2024-02-15 PROCEDURE — 80053 COMPREHEN METABOLIC PANEL: CPT

## 2024-02-15 PROCEDURE — 99285 EMERGENCY DEPT VISIT HI MDM: CPT

## 2024-02-15 PROCEDURE — 6360000002 HC RX W HCPCS: Performed by: EMERGENCY MEDICINE

## 2024-02-15 PROCEDURE — 85025 COMPLETE CBC W/AUTO DIFF WBC: CPT

## 2024-02-15 PROCEDURE — 72072 X-RAY EXAM THORAC SPINE 3VWS: CPT

## 2024-02-15 PROCEDURE — 83735 ASSAY OF MAGNESIUM: CPT

## 2024-02-15 RX ORDER — SODIUM CHLORIDE 0.9 % (FLUSH) 0.9 %
5-40 SYRINGE (ML) INJECTION EVERY 12 HOURS SCHEDULED
Status: DISCONTINUED | OUTPATIENT
Start: 2024-02-15 | End: 2024-02-26

## 2024-02-15 RX ORDER — SODIUM CHLORIDE 9 MG/ML
INJECTION, SOLUTION INTRAVENOUS PRN
Status: DISCONTINUED | OUTPATIENT
Start: 2024-02-15 | End: 2024-02-26 | Stop reason: HOSPADM

## 2024-02-15 RX ORDER — AMLODIPINE BESYLATE 10 MG/1
10 TABLET ORAL DAILY
Status: DISCONTINUED | OUTPATIENT
Start: 2024-02-16 | End: 2024-02-21

## 2024-02-15 RX ORDER — ONDANSETRON 4 MG/1
4 TABLET, ORALLY DISINTEGRATING ORAL EVERY 8 HOURS PRN
Status: DISCONTINUED | OUTPATIENT
Start: 2024-02-15 | End: 2024-02-26 | Stop reason: HOSPADM

## 2024-02-15 RX ORDER — ONDANSETRON 2 MG/ML
4 INJECTION INTRAMUSCULAR; INTRAVENOUS EVERY 6 HOURS PRN
Status: DISCONTINUED | OUTPATIENT
Start: 2024-02-15 | End: 2024-02-26 | Stop reason: HOSPADM

## 2024-02-15 RX ORDER — SODIUM CHLORIDE 0.9 % (FLUSH) 0.9 %
5-40 SYRINGE (ML) INJECTION PRN
Status: DISCONTINUED | OUTPATIENT
Start: 2024-02-15 | End: 2024-02-26 | Stop reason: HOSPADM

## 2024-02-15 RX ORDER — QUETIAPINE FUMARATE 25 MG/1
25 TABLET, FILM COATED ORAL NIGHTLY
Status: DISCONTINUED | OUTPATIENT
Start: 2024-02-15 | End: 2024-02-18

## 2024-02-15 RX ORDER — MEMANTINE HYDROCHLORIDE 5 MG/1
5 TABLET ORAL 2 TIMES DAILY
Status: DISCONTINUED | OUTPATIENT
Start: 2024-02-15 | End: 2024-02-26 | Stop reason: HOSPADM

## 2024-02-15 RX ORDER — ACETAMINOPHEN 325 MG/1
650 TABLET ORAL EVERY 4 HOURS PRN
Status: DISCONTINUED | OUTPATIENT
Start: 2024-02-15 | End: 2024-02-26 | Stop reason: HOSPADM

## 2024-02-15 RX ADMIN — WATER 1000 MG: 1 INJECTION INTRAMUSCULAR; INTRAVENOUS; SUBCUTANEOUS at 23:53

## 2024-02-15 RX ADMIN — SODIUM CHLORIDE, PRESERVATIVE FREE 10 ML: 5 INJECTION INTRAVENOUS at 23:54

## 2024-02-15 RX ADMIN — QUETIAPINE FUMARATE 25 MG: 25 TABLET ORAL at 21:07

## 2024-02-15 RX ADMIN — SODIUM CHLORIDE 0.5 MG: 9 INJECTION INTRAMUSCULAR; INTRAVENOUS; SUBCUTANEOUS at 17:09

## 2024-02-15 ASSESSMENT — PAIN - FUNCTIONAL ASSESSMENT: PAIN_FUNCTIONAL_ASSESSMENT: FACE, LEGS, ACTIVITY, CRY, AND CONSOLABILITY (FLACC)

## 2024-02-15 NOTE — ED TRIAGE NOTES
Pt from [facility]. History of dementia having increase combativeness. Throwing things and hitting staff x7 days.

## 2024-02-15 NOTE — ED PROVIDER NOTES
Emergency Department Provider Note       PCP: Heena Rasmussen MD   Age: 88 y.o.   Sex: female     DISPOSITION       No diagnosis found.    Medical Decision Making     Complexity of Problems Addressed:  1 or more acute illnesses that pose a threat to life or bodily function.     Data Reviewed and Analyzed:   I independently ordered and reviewed each unique test.  I reviewed external records: ED visit note from an outside group.  I reviewed external records: provider visit note from PCP.  I reviewed external records: provider visit note from outside specialist.  I reviewed external records: previous EKG including cardiologist interpretation.    I reviewed external records: previous lab results from outside ED.  I reviewed external records: previous imaging study including radiologist interpretation.   The patients assessment required an independent historian: Daughter at bedside.  The reason they were needed is important historical information not provided by the patient.    I independently ordered and interpreted the ED EKG in the absence of a Cardiologist.    Rate: 63  EKG Interpretation: EKG Interpretation: sinus rhythm, no evidence of arrhythmia, no acute changes, and non-specific EKG  ST Segments: Normal ST segments - NO STEMI      I independently interpreted the cardiac monitor rhythm strip normal sinus rhythm without ectopy.  I interpreted the X-rays chest x-ray and T-spine x-rays are negative for her infiltrate effusion, fracture, dislocation.  I interpreted the CT Scan CT scan of the brain shows subacute to chronic subdurals new since December 30.    Discussion of management or test interpretation.  Elderly lady with dementia and behavioral disturbance over the last week, she appears to have a UTI, and relatively new subdural hematomas appearing in just the last 6 weeks.  She had a fall day before yesterday and another 1 almost exactly 1 week ago.  There have probably been other undocumented falls.  Will

## 2024-02-16 ENCOUNTER — APPOINTMENT (OUTPATIENT)
Dept: CT IMAGING | Age: 89
DRG: 082 | End: 2024-02-16
Payer: MEDICARE

## 2024-02-16 LAB
ANION GAP SERPL CALC-SCNC: 6 MMOL/L (ref 2–11)
BUN SERPL-MCNC: 14 MG/DL (ref 8–23)
CALCIUM SERPL-MCNC: 8.7 MG/DL (ref 8.3–10.4)
CHLORIDE SERPL-SCNC: 111 MMOL/L (ref 103–113)
CHOLEST SERPL-MCNC: 268 MG/DL
CO2 SERPL-SCNC: 26 MMOL/L (ref 21–32)
CREAT SERPL-MCNC: 0.69 MG/DL (ref 0.6–1)
EKG ATRIAL RATE: 63 BPM
EKG DIAGNOSIS: NORMAL
EKG P AXIS: 78 DEGREES
EKG P-R INTERVAL: 142 MS
EKG Q-T INTERVAL: 428 MS
EKG QRS DURATION: 89 MS
EKG QTC CALCULATION (BAZETT): 439 MS
EKG R AXIS: 79 DEGREES
EKG T AXIS: 63 DEGREES
EKG VENTRICULAR RATE: 63 BPM
ERYTHROCYTE [DISTWIDTH] IN BLOOD BY AUTOMATED COUNT: 15.9 % (ref 11.9–14.6)
EST. AVERAGE GLUCOSE BLD GHB EST-MCNC: 100 MG/DL
GLUCOSE SERPL-MCNC: 85 MG/DL (ref 65–100)
HBA1C MFR BLD: 5.1 % (ref 4.8–5.6)
HCT VFR BLD AUTO: 36.5 % (ref 35.8–46.3)
HDLC SERPL-MCNC: 72 MG/DL (ref 40–60)
HDLC SERPL: 3.7
HGB BLD-MCNC: 11.5 G/DL (ref 11.7–15.4)
LDLC SERPL CALC-MCNC: 166 MG/DL
MCH RBC QN AUTO: 30.1 PG (ref 26.1–32.9)
MCHC RBC AUTO-ENTMCNC: 31.5 G/DL (ref 31.4–35)
MCV RBC AUTO: 95.5 FL (ref 82–102)
NRBC # BLD: 0 K/UL (ref 0–0.2)
PLATELET # BLD AUTO: 203 K/UL (ref 150–450)
PMV BLD AUTO: 9.7 FL (ref 9.4–12.3)
POTASSIUM SERPL-SCNC: 3.4 MMOL/L (ref 3.5–5.1)
RBC # BLD AUTO: 3.82 M/UL (ref 4.05–5.2)
SODIUM SERPL-SCNC: 143 MMOL/L (ref 136–146)
TRIGL SERPL-MCNC: 150 MG/DL (ref 35–150)
VLDLC SERPL CALC-MCNC: 30 MG/DL (ref 6–23)
WBC # BLD AUTO: 3.3 K/UL (ref 4.3–11.1)

## 2024-02-16 PROCEDURE — 2000000000 HC ICU R&B

## 2024-02-16 PROCEDURE — 6370000000 HC RX 637 (ALT 250 FOR IP): Performed by: HOSPITALIST

## 2024-02-16 PROCEDURE — 80061 LIPID PANEL: CPT

## 2024-02-16 PROCEDURE — 80048 BASIC METABOLIC PNL TOTAL CA: CPT

## 2024-02-16 PROCEDURE — 2580000003 HC RX 258: Performed by: HOSPITALIST

## 2024-02-16 PROCEDURE — 83036 HEMOGLOBIN GLYCOSYLATED A1C: CPT

## 2024-02-16 PROCEDURE — 70450 CT HEAD/BRAIN W/O DYE: CPT

## 2024-02-16 PROCEDURE — 6360000002 HC RX W HCPCS: Performed by: HOSPITALIST

## 2024-02-16 PROCEDURE — 36415 COLL VENOUS BLD VENIPUNCTURE: CPT

## 2024-02-16 PROCEDURE — 2500000003 HC RX 250 WO HCPCS: Performed by: HOSPITALIST

## 2024-02-16 PROCEDURE — 85027 COMPLETE CBC AUTOMATED: CPT

## 2024-02-16 PROCEDURE — 97163 PT EVAL HIGH COMPLEX 45 MIN: CPT

## 2024-02-16 PROCEDURE — 97530 THERAPEUTIC ACTIVITIES: CPT

## 2024-02-16 PROCEDURE — 92610 EVALUATE SWALLOWING FUNCTION: CPT

## 2024-02-16 RX ORDER — HALOPERIDOL 5 MG/ML
2 INJECTION INTRAMUSCULAR ONCE
Status: COMPLETED | OUTPATIENT
Start: 2024-02-16 | End: 2024-02-16

## 2024-02-16 RX ORDER — OLANZAPINE 5 MG/1
5 TABLET, ORALLY DISINTEGRATING ORAL 2 TIMES DAILY PRN
Status: DISCONTINUED | OUTPATIENT
Start: 2024-02-16 | End: 2024-02-18

## 2024-02-16 RX ORDER — POTASSIUM CHLORIDE 7.45 MG/ML
10 INJECTION INTRAVENOUS
Status: COMPLETED | OUTPATIENT
Start: 2024-02-16 | End: 2024-02-16

## 2024-02-16 RX ADMIN — SODIUM CHLORIDE, PRESERVATIVE FREE 10 ML: 5 INJECTION INTRAVENOUS at 10:23

## 2024-02-16 RX ADMIN — SODIUM CHLORIDE: 9 INJECTION, SOLUTION INTRAVENOUS at 10:20

## 2024-02-16 RX ADMIN — SERTRALINE HYDROCHLORIDE 50 MG: 50 TABLET ORAL at 10:54

## 2024-02-16 RX ADMIN — MEMANTINE 5 MG: 5 TABLET ORAL at 10:54

## 2024-02-16 RX ADMIN — POTASSIUM CHLORIDE 10 MEQ: 7.46 INJECTION, SOLUTION INTRAVENOUS at 11:19

## 2024-02-16 RX ADMIN — MEMANTINE 5 MG: 5 TABLET ORAL at 20:37

## 2024-02-16 RX ADMIN — AMLODIPINE BESYLATE 10 MG: 10 TABLET ORAL at 10:53

## 2024-02-16 RX ADMIN — POTASSIUM CHLORIDE 10 MEQ: 7.46 INJECTION, SOLUTION INTRAVENOUS at 10:20

## 2024-02-16 RX ADMIN — QUETIAPINE FUMARATE 25 MG: 25 TABLET ORAL at 20:37

## 2024-02-16 RX ADMIN — HALOPERIDOL LACTATE 2 MG: 5 INJECTION, SOLUTION INTRAMUSCULAR at 00:39

## 2024-02-16 RX ADMIN — SODIUM CHLORIDE, PRESERVATIVE FREE 10 ML: 5 INJECTION INTRAVENOUS at 20:41

## 2024-02-16 RX ADMIN — TUBERCULIN PURIFIED PROTEIN DERIVATIVE 5 UNITS: 5 INJECTION, SOLUTION INTRADERMAL at 12:21

## 2024-02-16 RX ADMIN — WATER 1000 MG: 1 INJECTION INTRAMUSCULAR; INTRAVENOUS; SUBCUTANEOUS at 20:37

## 2024-02-16 ASSESSMENT — PAIN SCALES - GENERAL
PAINLEVEL_OUTOF10: 0

## 2024-02-16 NOTE — CARE COORDINATION
Discharge planning was discussed with the Critical Care Interdisciplinary Team. The patient admitted from [facility]. She is pending PT/OT evaluations and is not yet medically ready for discharge. Discharge planning is pending the patient's clinical course in the hospital.    An [employer] magazine was left at the bedside.

## 2024-02-16 NOTE — CARE COORDINATION
02/16/24 1128   Service Assessment   Patient Orientation Unable to Assess   Cognition Other (see comment)  (patient was sleeping)   History Provided By Child/Family  (daughter Alba)   Support Systems Family Members;Children   PCP Verified by CM Yes   Prior Functional Level Assistance with the following:   Current Functional Level Assistance with the following:   Can patient return to prior living arrangement Unknown at present   Family able to assist with home care needs: No   Would you like for me to discuss the discharge plan with any other family members/significant others, and if so, who? Yes  (daughter)   Financial Resources Medicare   Social/Functional History   Lives With Other (comment)  ([facility])   Type of Home Assisted living   Home Equipment Wheelchair-manual;Rollator;Cane;Walker, rolling   ADL Assistance Needs assistance   Mode of Transportation Car   Services At/After Discharge   Transition of Care Consult (CM Consult) Discharge Planning   Confirm Follow Up Transport Family     RN CM met with the patient's daughter Alba at the bedside and discussed discharge planning. The patient resides in [facility]. Alba is not sure if the patient will be able to return and believes the patient may need short term rehabilitation. The patient is pending PT/OT evaluations. Discharge planning is pending the patient's clinical course in the hospital. According to Alba, the patient's other daughter Marie Najera ([phone]) is the patient's healthcare power of . RN CM requested for her to provide the hospital with a copy of the healthcare power of  documentation.

## 2024-02-16 NOTE — ED NOTES
TRANSFER - OUT REPORT:    Verbal report given to ICU, RN on Alexys Zamora  being transferred to [facility] for routine progression of patient care       Report consisted of patient's Situation, Background, Assessment and   Recommendations(SBAR).     Information from the following report(s) Nurse Handoff Report was reviewed with the receiving nurse.    Michael Fall Assessment:    Presents to emergency department  because of falls (Syncope, seizure, or loss of consciousness): No  Age > 70: Yes  Altered Mental Status, Intoxication with alcohol or substance confusion (Disorientation, impaired judgment, poor safety awaremess, or inability to follow instructions): Yes  Impaired Mobility: Ambulates or transfers with assistive devices or assistance; Unable to ambulate or transer.: No  Nursing Judgement: No          Lines:   Peripheral IV 02/15/24 Right Forearm (Active)   Site Assessment Clean, dry & intact;Clean 02/15/24 1621   Line Status Blood return noted;Flushed;Specimen collected;Normal saline locked 02/15/24 1621   Phlebitis Assessment No symptoms 02/15/24 1621   Infiltration Assessment 0 02/15/24 1621   Dressing Status New dressing applied;Clean, dry & intact 02/15/24 1621   Dressing Type Transparent 02/15/24 1621        Opportunity for questions and clarification was provided.      Patient transported with:  Monitor

## 2024-02-16 NOTE — H&P
Hospitalist History and Physical   Admit Date:  2/15/2024  4:06 PM   Name:  Alexys Zamora   Age:  88 y.o.  Sex:  female  :  1935   MRN:  [id number]   Room:  [place] [address]    Presenting/Chief Complaint: Aggressive Behavior     Reason(s) for Admission: Subdural hematoma (HCC) [S06.5XAA]     History of Present Illness:     88 years old female with history of dementia currently in memory care unit brought to emergency room with aggressive behavior going on for past 10 days duration than baseline.  As per family patient was also having falls, documented fall was few days ago hitting her head.  Patient currently alert but not oriented, slightly agitated.  Lab work shows evidence of UTI CT scan shows evidence of new subacute on chronic subdural hematoma overlying the cerebral convexities, no midline shift.  Case discussed with neurosurgery Dr. Russell recommended no intervention.  Emergency room physician requested admission of this patient for further evaluation and management.  No history of fever, chills.  Unable to obtain any information from patient.  Patient daughter was at bedside MsJb Marie Mchughark who is patient's medical power of  who informs that patient is a DNR.              Assessment & Plan:       Acute on chronic subdural hematoma: Discussed with neurosurgery, patient will be admitted to ICU and the side, will monitor closely.  Will repeat CT in AM.    Metabolic encephalopathy: Likely secondary to UTI, initiated on antibiotics.  Follow blood cultures.    Dementia with behavioral disturbance: Patient was recently started on Seroquel at the memory care unit, will continue Seroquel.  Will continue memantine, sertraline.    Hypertension: Continue on amlodipine.            Diet: Diet NPO  VTE prophylaxis: SCD's   Code status: DNR      Non-peripheral Lines and Tubes (if present):             Hospital Problems:  Principal Problem:    Subdural hematoma (HCC)  Active Problems:    Essential hypertension

## 2024-02-16 NOTE — CARE COORDINATION
RN ZION spoke with the patient's daughter Alba. She stated she will be at the hospital in approximately 5 minutes and requested for case management to speak with her once she arrives at the hospital. She confirmed the patient admitted from [facility].

## 2024-02-17 LAB
ANION GAP SERPL CALC-SCNC: 1 MMOL/L (ref 2–11)
BASOPHILS # BLD: 0 K/UL (ref 0–0.2)
BASOPHILS NFR BLD: 1 % (ref 0–2)
BUN SERPL-MCNC: 14 MG/DL (ref 8–23)
CALCIUM SERPL-MCNC: 8.7 MG/DL (ref 8.3–10.4)
CHLORIDE SERPL-SCNC: 114 MMOL/L (ref 103–113)
CO2 SERPL-SCNC: 29 MMOL/L (ref 21–32)
CREAT SERPL-MCNC: 0.67 MG/DL (ref 0.6–1)
DIFFERENTIAL METHOD BLD: ABNORMAL
EOSINOPHIL # BLD: 0.1 K/UL (ref 0–0.8)
EOSINOPHIL NFR BLD: 2 % (ref 0.5–7.8)
ERYTHROCYTE [DISTWIDTH] IN BLOOD BY AUTOMATED COUNT: 16 % (ref 11.9–14.6)
GLUCOSE SERPL-MCNC: 92 MG/DL (ref 65–100)
HCT VFR BLD AUTO: 36.3 % (ref 35.8–46.3)
HGB BLD-MCNC: 11.5 G/DL (ref 11.7–15.4)
IMM GRANULOCYTES # BLD AUTO: 0 K/UL (ref 0–0.5)
IMM GRANULOCYTES NFR BLD AUTO: 0 % (ref 0–5)
LYMPHOCYTES # BLD: 1.1 K/UL (ref 0.5–4.6)
LYMPHOCYTES NFR BLD: 34 % (ref 13–44)
MCH RBC QN AUTO: 30 PG (ref 26.1–32.9)
MCHC RBC AUTO-ENTMCNC: 31.7 G/DL (ref 31.4–35)
MCV RBC AUTO: 94.8 FL (ref 82–102)
MM INDURATION, POC: 0 MM (ref 0–5)
MONOCYTES # BLD: 0.5 K/UL (ref 0.1–1.3)
MONOCYTES NFR BLD: 14 % (ref 4–12)
NEUTS SEG # BLD: 1.6 K/UL (ref 1.7–8.2)
NEUTS SEG NFR BLD: 49 % (ref 43–78)
NRBC # BLD: 0 K/UL (ref 0–0.2)
PLATELET # BLD AUTO: 214 K/UL (ref 150–450)
PMV BLD AUTO: 9.6 FL (ref 9.4–12.3)
POTASSIUM SERPL-SCNC: 3.8 MMOL/L (ref 3.5–5.1)
PPD, POC: NEGATIVE
RBC # BLD AUTO: 3.83 M/UL (ref 4.05–5.2)
SODIUM SERPL-SCNC: 144 MMOL/L (ref 136–146)
WBC # BLD AUTO: 3.3 K/UL (ref 4.3–11.1)

## 2024-02-17 PROCEDURE — 2580000003 HC RX 258: Performed by: HOSPITALIST

## 2024-02-17 PROCEDURE — 6360000002 HC RX W HCPCS: Performed by: HOSPITALIST

## 2024-02-17 PROCEDURE — 85025 COMPLETE CBC W/AUTO DIFF WBC: CPT

## 2024-02-17 PROCEDURE — 2000000000 HC ICU R&B

## 2024-02-17 PROCEDURE — 36415 COLL VENOUS BLD VENIPUNCTURE: CPT

## 2024-02-17 PROCEDURE — 6370000000 HC RX 637 (ALT 250 FOR IP): Performed by: HOSPITALIST

## 2024-02-17 PROCEDURE — 80048 BASIC METABOLIC PNL TOTAL CA: CPT

## 2024-02-17 RX ORDER — DIVALPROEX SODIUM 125 MG/1
125 TABLET, DELAYED RELEASE ORAL 2 TIMES DAILY
Status: ON HOLD | COMMUNITY
Start: 2024-01-11 | End: 2024-02-26 | Stop reason: HOSPADM

## 2024-02-17 RX ORDER — ACETAMINOPHEN 160 MG
1 TABLET,DISINTEGRATING ORAL DAILY
Status: ON HOLD | COMMUNITY
End: 2024-02-26

## 2024-02-17 RX ORDER — HALOPERIDOL 5 MG/ML
2 INJECTION INTRAMUSCULAR EVERY 6 HOURS PRN
Status: DISCONTINUED | OUTPATIENT
Start: 2024-02-17 | End: 2024-02-26 | Stop reason: HOSPADM

## 2024-02-17 RX ORDER — DOCUSATE SODIUM 100 MG/1
100 CAPSULE, LIQUID FILLED ORAL
Status: ON HOLD | COMMUNITY
End: 2024-02-26 | Stop reason: HOSPADM

## 2024-02-17 RX ORDER — LANOLIN ALCOHOL/MO/W.PET/CERES
3 CREAM (GRAM) TOPICAL NIGHTLY
Status: ON HOLD | COMMUNITY
End: 2024-02-26 | Stop reason: HOSPADM

## 2024-02-17 RX ORDER — QUETIAPINE FUMARATE 25 MG/1
12.5-25 TABLET, FILM COATED ORAL SEE ADMIN INSTRUCTIONS
Status: ON HOLD | COMMUNITY
Start: 2024-02-15 | End: 2024-02-26 | Stop reason: HOSPADM

## 2024-02-17 RX ORDER — MENTHOL AND ZINC OXIDE .44; 20.625 G/100G; G/100G
OINTMENT TOPICAL 3 TIMES DAILY PRN
Status: ON HOLD | COMMUNITY
End: 2024-02-26 | Stop reason: HOSPADM

## 2024-02-17 RX ORDER — LORAZEPAM 0.5 MG/1
0.5 TABLET ORAL 3 TIMES DAILY PRN
Status: ON HOLD | COMMUNITY
End: 2024-02-26 | Stop reason: HOSPADM

## 2024-02-17 RX ORDER — AMLODIPINE BESYLATE 10 MG/1
10 TABLET ORAL DAILY
Status: ON HOLD | COMMUNITY
Start: 2024-01-11 | End: 2024-02-26

## 2024-02-17 RX ADMIN — MEMANTINE 5 MG: 5 TABLET ORAL at 20:48

## 2024-02-17 RX ADMIN — QUETIAPINE FUMARATE 25 MG: 25 TABLET ORAL at 20:48

## 2024-02-17 RX ADMIN — SERTRALINE HYDROCHLORIDE 50 MG: 50 TABLET ORAL at 09:49

## 2024-02-17 RX ADMIN — MEMANTINE 5 MG: 5 TABLET ORAL at 09:49

## 2024-02-17 RX ADMIN — WATER 1000 MG: 1 INJECTION INTRAMUSCULAR; INTRAVENOUS; SUBCUTANEOUS at 20:06

## 2024-02-17 RX ADMIN — SODIUM CHLORIDE, PRESERVATIVE FREE 10 ML: 5 INJECTION INTRAVENOUS at 19:50

## 2024-02-17 RX ADMIN — AMLODIPINE BESYLATE 10 MG: 10 TABLET ORAL at 09:49

## 2024-02-17 RX ADMIN — SODIUM CHLORIDE, PRESERVATIVE FREE 10 ML: 5 INJECTION INTRAVENOUS at 09:49

## 2024-02-17 RX ADMIN — HALOPERIDOL LACTATE 2 MG: 5 INJECTION, SOLUTION INTRAMUSCULAR at 14:32

## 2024-02-17 RX ADMIN — WATER 5 MG: 1 INJECTION INTRAMUSCULAR; INTRAVENOUS; SUBCUTANEOUS at 10:53

## 2024-02-17 ASSESSMENT — PAIN SCALES - GENERAL
PAINLEVEL_OUTOF10: 0

## 2024-02-18 LAB
BACTERIA SPEC CULT: NORMAL
BACTERIA SPEC CULT: NORMAL
MM INDURATION, POC: 0 MM (ref 0–5)
PPD, POC: NEGATIVE
SERVICE CMNT-IMP: NORMAL

## 2024-02-18 PROCEDURE — 6370000000 HC RX 637 (ALT 250 FOR IP): Performed by: HOSPITALIST

## 2024-02-18 PROCEDURE — 2580000003 HC RX 258: Performed by: HOSPITALIST

## 2024-02-18 PROCEDURE — 1100000000 HC RM PRIVATE

## 2024-02-18 RX ORDER — QUETIAPINE FUMARATE 25 MG/1
25 TABLET, FILM COATED ORAL 2 TIMES DAILY
Status: DISCONTINUED | OUTPATIENT
Start: 2024-02-18 | End: 2024-02-22

## 2024-02-18 RX ADMIN — SODIUM CHLORIDE, PRESERVATIVE FREE 10 ML: 5 INJECTION INTRAVENOUS at 09:29

## 2024-02-18 RX ADMIN — MEMANTINE 5 MG: 5 TABLET ORAL at 09:25

## 2024-02-18 RX ADMIN — MEMANTINE 5 MG: 5 TABLET ORAL at 19:57

## 2024-02-18 RX ADMIN — ACETAMINOPHEN 650 MG: 325 TABLET, FILM COATED ORAL at 19:56

## 2024-02-18 RX ADMIN — QUETIAPINE FUMARATE 25 MG: 25 TABLET ORAL at 09:25

## 2024-02-18 RX ADMIN — AMLODIPINE BESYLATE 10 MG: 10 TABLET ORAL at 09:25

## 2024-02-18 RX ADMIN — SERTRALINE HYDROCHLORIDE 50 MG: 50 TABLET ORAL at 09:25

## 2024-02-18 RX ADMIN — QUETIAPINE FUMARATE 25 MG: 25 TABLET ORAL at 19:57

## 2024-02-18 RX ADMIN — SODIUM CHLORIDE, PRESERVATIVE FREE 10 ML: 5 INJECTION INTRAVENOUS at 19:40

## 2024-02-18 ASSESSMENT — PAIN SCALES - GENERAL
PAINLEVEL_OUTOF10: 0
PAINLEVEL_OUTOF10: 3

## 2024-02-18 NOTE — FLOWSHEET NOTE
02/18/24 0647   Handoff   Communication Given Shift Handoff   Handoff Given To ALEX Duque   Handoff Received From Corinne, RN   Handoff Communication Face to Face;At bedside;In department   Time Handoff Given 0647   End of Shift Check Performed Yes

## 2024-02-18 NOTE — CARE COORDINATION
Transition of Care Plan:    RUR: 14% \"low risk\"  Prior Level of Functioning: Dependent with ADL's  Disposition: Return to half-way with PT  DME needed: Defer to half-way  Transportation at discharge: BLS transport  IM/IMM Medicare: IM letter needed at d/c  Is patient a [place] and connected with [facility]? N/A  Caregiver Contact: Pt's daughter Alba Robertson [phone]  Discharge Caregiver contacted prior to discharge? Per pt's request  Care Conference needed? No  Barriers to discharge: Clinical improvement    Initial note: Chart reviewed for updates. PT is recommending that pt return to [facility] with PT services for increased physical assistance. OT is pending evals. Pt is also pending medical stability. CM will continue to follow up with d/c planning.    TERRANCE Rodriguez

## 2024-02-19 LAB
ANION GAP SERPL CALC-SCNC: 4 MMOL/L (ref 2–11)
BASOPHILS # BLD: 0 K/UL (ref 0–0.2)
BASOPHILS NFR BLD: 1 % (ref 0–2)
BUN SERPL-MCNC: 16 MG/DL (ref 8–23)
CALCIUM SERPL-MCNC: 9.2 MG/DL (ref 8.3–10.4)
CHLORIDE SERPL-SCNC: 112 MMOL/L (ref 103–113)
CO2 SERPL-SCNC: 25 MMOL/L (ref 21–32)
CREAT SERPL-MCNC: 0.74 MG/DL (ref 0.6–1)
DIFFERENTIAL METHOD BLD: ABNORMAL
EOSINOPHIL # BLD: 0.1 K/UL (ref 0–0.8)
EOSINOPHIL NFR BLD: 4 % (ref 0.5–7.8)
ERYTHROCYTE [DISTWIDTH] IN BLOOD BY AUTOMATED COUNT: 15.6 % (ref 11.9–14.6)
GLUCOSE SERPL-MCNC: 95 MG/DL (ref 65–100)
HCT VFR BLD AUTO: 39.1 % (ref 35.8–46.3)
HGB BLD-MCNC: 12.6 G/DL (ref 11.7–15.4)
IMM GRANULOCYTES # BLD AUTO: 0 K/UL (ref 0–0.5)
IMM GRANULOCYTES NFR BLD AUTO: 0 % (ref 0–5)
LYMPHOCYTES # BLD: 1.3 K/UL (ref 0.5–4.6)
LYMPHOCYTES NFR BLD: 38 % (ref 13–44)
MCH RBC QN AUTO: 30.6 PG (ref 26.1–32.9)
MCHC RBC AUTO-ENTMCNC: 32.2 G/DL (ref 31.4–35)
MCV RBC AUTO: 94.9 FL (ref 82–102)
MONOCYTES # BLD: 0.5 K/UL (ref 0.1–1.3)
MONOCYTES NFR BLD: 14 % (ref 4–12)
NEUTS SEG # BLD: 1.5 K/UL (ref 1.7–8.2)
NEUTS SEG NFR BLD: 43 % (ref 43–78)
NRBC # BLD: 0 K/UL (ref 0–0.2)
PLATELET # BLD AUTO: 230 K/UL (ref 150–450)
PMV BLD AUTO: 9.6 FL (ref 9.4–12.3)
POTASSIUM SERPL-SCNC: 3.7 MMOL/L (ref 3.5–5.1)
RBC # BLD AUTO: 4.12 M/UL (ref 4.05–5.2)
SARS-COV-2 RDRP RESP QL NAA+PROBE: NOT DETECTED
SODIUM SERPL-SCNC: 141 MMOL/L (ref 136–146)
SOURCE: NORMAL
WBC # BLD AUTO: 3.4 K/UL (ref 4.3–11.1)

## 2024-02-19 PROCEDURE — 85025 COMPLETE CBC W/AUTO DIFF WBC: CPT

## 2024-02-19 PROCEDURE — 6370000000 HC RX 637 (ALT 250 FOR IP): Performed by: HOSPITALIST

## 2024-02-19 PROCEDURE — 6370000000 HC RX 637 (ALT 250 FOR IP): Performed by: STUDENT IN AN ORGANIZED HEALTH CARE EDUCATION/TRAINING PROGRAM

## 2024-02-19 PROCEDURE — 80048 BASIC METABOLIC PNL TOTAL CA: CPT

## 2024-02-19 PROCEDURE — 2580000003 HC RX 258: Performed by: HOSPITALIST

## 2024-02-19 PROCEDURE — 87635 SARS-COV-2 COVID-19 AMP PRB: CPT

## 2024-02-19 PROCEDURE — 1100000000 HC RM PRIVATE

## 2024-02-19 PROCEDURE — 36415 COLL VENOUS BLD VENIPUNCTURE: CPT

## 2024-02-19 PROCEDURE — 6360000002 HC RX W HCPCS: Performed by: HOSPITALIST

## 2024-02-19 RX ORDER — ATORVASTATIN CALCIUM 40 MG/1
40 TABLET, FILM COATED ORAL NIGHTLY
Status: DISCONTINUED | OUTPATIENT
Start: 2024-02-19 | End: 2024-02-26 | Stop reason: HOSPADM

## 2024-02-19 RX ADMIN — SODIUM CHLORIDE, PRESERVATIVE FREE 10 ML: 5 INJECTION INTRAVENOUS at 09:32

## 2024-02-19 RX ADMIN — SERTRALINE HYDROCHLORIDE 50 MG: 50 TABLET ORAL at 09:10

## 2024-02-19 RX ADMIN — HALOPERIDOL LACTATE 2 MG: 5 INJECTION, SOLUTION INTRAMUSCULAR at 15:05

## 2024-02-19 RX ADMIN — QUETIAPINE FUMARATE 25 MG: 25 TABLET ORAL at 21:15

## 2024-02-19 RX ADMIN — SODIUM CHLORIDE, PRESERVATIVE FREE 10 ML: 5 INJECTION INTRAVENOUS at 21:16

## 2024-02-19 ASSESSMENT — PAIN SCALES - GENERAL
PAINLEVEL_OUTOF10: 0
PAINLEVEL_OUTOF10: 0

## 2024-02-19 NOTE — FLOWSHEET NOTE
02/19/24 0646   Handoff   Communication Given Shift Handoff   Handoff Given To ALEX Duque   Handoff Received From Corinne, RN   Handoff Communication Face to Face;At bedside;In department   Time Handoff Given 0646   End of Shift Check Performed Yes

## 2024-02-20 PROCEDURE — 6370000000 HC RX 637 (ALT 250 FOR IP): Performed by: HOSPITALIST

## 2024-02-20 PROCEDURE — 97530 THERAPEUTIC ACTIVITIES: CPT

## 2024-02-20 PROCEDURE — 2580000003 HC RX 258: Performed by: HOSPITALIST

## 2024-02-20 PROCEDURE — 1100000000 HC RM PRIVATE

## 2024-02-20 PROCEDURE — 6370000000 HC RX 637 (ALT 250 FOR IP): Performed by: STUDENT IN AN ORGANIZED HEALTH CARE EDUCATION/TRAINING PROGRAM

## 2024-02-20 PROCEDURE — 97165 OT EVAL LOW COMPLEX 30 MIN: CPT

## 2024-02-20 RX ADMIN — QUETIAPINE FUMARATE 25 MG: 25 TABLET ORAL at 21:08

## 2024-02-20 RX ADMIN — MEMANTINE 5 MG: 5 TABLET ORAL at 21:08

## 2024-02-20 RX ADMIN — QUETIAPINE FUMARATE 25 MG: 25 TABLET ORAL at 08:01

## 2024-02-20 RX ADMIN — SODIUM CHLORIDE, PRESERVATIVE FREE 10 ML: 5 INJECTION INTRAVENOUS at 09:43

## 2024-02-20 RX ADMIN — SODIUM CHLORIDE, PRESERVATIVE FREE 10 ML: 5 INJECTION INTRAVENOUS at 19:18

## 2024-02-20 RX ADMIN — MEMANTINE 5 MG: 5 TABLET ORAL at 08:01

## 2024-02-20 RX ADMIN — SERTRALINE HYDROCHLORIDE 50 MG: 50 TABLET ORAL at 08:01

## 2024-02-20 RX ADMIN — ATORVASTATIN CALCIUM 40 MG: 40 TABLET, FILM COATED ORAL at 21:08

## 2024-02-20 ASSESSMENT — PAIN SCALES - GENERAL
PAINLEVEL_OUTOF10: 0

## 2024-02-20 NOTE — CARE COORDINATION
RN CM spoke with the patient's daughter Marie over the telephone and discussed discharge planning. She expressed concerns regarding the patient's discharge location. She is unable to provide the patient with 24/7 care due to her own health issues. Referrals were sent yesterday by a different CM to [facility], [facility], and [facility] but has not received any bed offers. The current recommendation is for the patient to return to memory care. RN CM discussed the patient returning to her memory care unit. Long term care in a skilled nursing facility was also discussed with Marie.  She expressed concerns regarding the patient's care at her current facility. She was provided with a phone number for the "[employer]man. She was provided with the contact information for [facility]. She is considering hospice services. She requested to speak with the physician. The physician was notified of her request.

## 2024-02-21 LAB
MM INDURATION, POC: 0 MM (ref 0–5)
PPD, POC: NEGATIVE

## 2024-02-21 PROCEDURE — 6370000000 HC RX 637 (ALT 250 FOR IP): Performed by: HOSPITALIST

## 2024-02-21 PROCEDURE — 1100000000 HC RM PRIVATE

## 2024-02-21 PROCEDURE — 2580000003 HC RX 258: Performed by: HOSPITALIST

## 2024-02-21 PROCEDURE — 6370000000 HC RX 637 (ALT 250 FOR IP): Performed by: STUDENT IN AN ORGANIZED HEALTH CARE EDUCATION/TRAINING PROGRAM

## 2024-02-21 PROCEDURE — 6360000002 HC RX W HCPCS: Performed by: HOSPITALIST

## 2024-02-21 PROCEDURE — 97530 THERAPEUTIC ACTIVITIES: CPT

## 2024-02-21 RX ORDER — AMLODIPINE BESYLATE 5 MG/1
5 TABLET ORAL DAILY
Status: DISCONTINUED | OUTPATIENT
Start: 2024-02-21 | End: 2024-02-26 | Stop reason: HOSPADM

## 2024-02-21 RX ADMIN — SODIUM CHLORIDE, PRESERVATIVE FREE 10 ML: 5 INJECTION INTRAVENOUS at 09:54

## 2024-02-21 RX ADMIN — HALOPERIDOL LACTATE 2 MG: 5 INJECTION, SOLUTION INTRAMUSCULAR at 21:51

## 2024-02-21 ASSESSMENT — PAIN SCALES - GENERAL: PAINLEVEL_OUTOF10: 0

## 2024-02-21 NOTE — CARE COORDINATION
0830: Discharge planning was discussed with the Critical Care Interdisciplinary Team. Discharge planning is pending the patient's clinical course in the hospital. She is pending a repeat physical therapy evaluation today.    0952: ALEX DONOVAN called [facility] and left a HIPAA compliant voicemail with the admission coordinator requesting a return phone call.    1225: RN CM left a HIPAA compliant voicemail with [facility] [place] requesting a return phone call.    1244: Phone call received from Roberth at [facility]. Per her report, she has made several attempts to call the family but has not been successful. She will need to evaluate the patient prior to discharge if the patient plans on returning to [facility]. [facility] and [facility] declined the patient for services.    1341: Phone call received from Roberth at [facility]. The facility is recommending short term rehabilitation.    1347: RN CM spoke with the liaison at [facility]. They are unable to accept her for services. RN CM spoke with the liaison at [facility]. The facility does not have any beds available.     1428:RN CM called the patient's daughter Marie and attempted to discuss discharge planning, including skilled nursing facilities, long term care and memory care units. RN CM encouraged her to contact [facility] if she wants the patient to discharge to a different memory care unit. She stated she has a doctor's appointment, is unable to discuss discharge planning at this time and she disconnected the call.

## 2024-02-22 PROCEDURE — 2580000003 HC RX 258: Performed by: HOSPITALIST

## 2024-02-22 PROCEDURE — 2580000003 HC RX 258: Performed by: STUDENT IN AN ORGANIZED HEALTH CARE EDUCATION/TRAINING PROGRAM

## 2024-02-22 PROCEDURE — 97530 THERAPEUTIC ACTIVITIES: CPT

## 2024-02-22 PROCEDURE — 6370000000 HC RX 637 (ALT 250 FOR IP): Performed by: STUDENT IN AN ORGANIZED HEALTH CARE EDUCATION/TRAINING PROGRAM

## 2024-02-22 PROCEDURE — 97110 THERAPEUTIC EXERCISES: CPT

## 2024-02-22 PROCEDURE — 6370000000 HC RX 637 (ALT 250 FOR IP): Performed by: HOSPITALIST

## 2024-02-22 PROCEDURE — 1100000000 HC RM PRIVATE

## 2024-02-22 RX ORDER — SODIUM CHLORIDE 9 MG/ML
INJECTION, SOLUTION INTRAVENOUS CONTINUOUS
Status: ACTIVE | OUTPATIENT
Start: 2024-02-22 | End: 2024-02-23

## 2024-02-22 RX ORDER — QUETIAPINE FUMARATE 25 MG/1
12.5 TABLET, FILM COATED ORAL DAILY
Status: DISCONTINUED | OUTPATIENT
Start: 2024-02-23 | End: 2024-02-26 | Stop reason: HOSPADM

## 2024-02-22 RX ORDER — QUETIAPINE FUMARATE 25 MG/1
25 TABLET, FILM COATED ORAL NIGHTLY
Status: DISCONTINUED | OUTPATIENT
Start: 2024-02-22 | End: 2024-02-26 | Stop reason: HOSPADM

## 2024-02-22 RX ADMIN — MEMANTINE 5 MG: 5 TABLET ORAL at 09:11

## 2024-02-22 RX ADMIN — SERTRALINE HYDROCHLORIDE 50 MG: 50 TABLET ORAL at 09:11

## 2024-02-22 RX ADMIN — QUETIAPINE FUMARATE 25 MG: 25 TABLET ORAL at 09:12

## 2024-02-22 RX ADMIN — AMLODIPINE BESYLATE 5 MG: 5 TABLET ORAL at 09:11

## 2024-02-22 RX ADMIN — SODIUM CHLORIDE, PRESERVATIVE FREE 10 ML: 5 INJECTION INTRAVENOUS at 09:12

## 2024-02-22 RX ADMIN — SODIUM CHLORIDE: 9 INJECTION, SOLUTION INTRAVENOUS at 16:12

## 2024-02-22 ASSESSMENT — PAIN SCALES - GENERAL: PAINLEVEL_OUTOF10: 0

## 2024-02-22 NOTE — CARE COORDINATION
Discharge planning was discussed with the attending physician. He is going to speak with the family today.

## 2024-02-22 NOTE — CARE COORDINATION
RN CM spoke with the patient's daughter Marie Desai over the telephone and discussed discharge planning. She is in agreement for the patient to return to [facility] if they can accept her for services. She inquired about different memory care facilities, including [facility] in [place]. ALEX DONOVAN encouraged her to call [facility] for assistance with locating a different memory care unit.    RN CM called the liaison at [facility] and left a HIPAA compliant voicemail requesting a return phone call.

## 2024-02-23 PROCEDURE — 6370000000 HC RX 637 (ALT 250 FOR IP): Performed by: HOSPITALIST

## 2024-02-23 PROCEDURE — 1100000000 HC RM PRIVATE

## 2024-02-23 PROCEDURE — 6370000000 HC RX 637 (ALT 250 FOR IP): Performed by: STUDENT IN AN ORGANIZED HEALTH CARE EDUCATION/TRAINING PROGRAM

## 2024-02-23 PROCEDURE — 2580000003 HC RX 258: Performed by: STUDENT IN AN ORGANIZED HEALTH CARE EDUCATION/TRAINING PROGRAM

## 2024-02-23 PROCEDURE — 6360000002 HC RX W HCPCS: Performed by: HOSPITALIST

## 2024-02-23 PROCEDURE — 2580000003 HC RX 258: Performed by: HOSPITALIST

## 2024-02-23 RX ADMIN — HALOPERIDOL LACTATE 2 MG: 5 INJECTION, SOLUTION INTRAMUSCULAR at 13:44

## 2024-02-23 RX ADMIN — MEMANTINE 5 MG: 5 TABLET ORAL at 07:53

## 2024-02-23 RX ADMIN — QUETIAPINE FUMARATE 12.5 MG: 25 TABLET ORAL at 07:53

## 2024-02-23 RX ADMIN — SODIUM CHLORIDE, PRESERVATIVE FREE 10 ML: 5 INJECTION INTRAVENOUS at 07:56

## 2024-02-23 RX ADMIN — SERTRALINE HYDROCHLORIDE 50 MG: 50 TABLET ORAL at 07:53

## 2024-02-23 RX ADMIN — QUETIAPINE FUMARATE 25 MG: 25 TABLET ORAL at 21:21

## 2024-02-23 RX ADMIN — ATORVASTATIN CALCIUM 40 MG: 40 TABLET, FILM COATED ORAL at 21:21

## 2024-02-23 RX ADMIN — MEMANTINE 5 MG: 5 TABLET ORAL at 21:21

## 2024-02-23 RX ADMIN — AMLODIPINE BESYLATE 5 MG: 5 TABLET ORAL at 07:53

## 2024-02-23 RX ADMIN — SODIUM CHLORIDE: 9 INJECTION, SOLUTION INTRAVENOUS at 06:02

## 2024-02-23 ASSESSMENT — PAIN SCALES - GENERAL
PAINLEVEL_OUTOF10: 0

## 2024-02-23 NOTE — CARE COORDINATION
Return phone call received from Marie. She is in agreement for the patient to discharge to [facility] on Monday, February 26, 2024. The \"[facility] Do Not Resuscitate Order\" form was discussed with Marie. She wants the patient to be a full code for medical transport to [facility] on Monday February 26, 2024.    Discharge planning was discussed with the attending MD.    ALEX DONOVAN spoke with Pascale at [facility]. She confirmed they can accept the patient on Monday. Per her report, she sent the contract to the patient's daughter Marie.    The attending physician completed the order forms for [facility]. They were sent by \"safemail\" to Pascale, the  at [facility].

## 2024-02-23 NOTE — CARE COORDINATION
0903: ALEX DONOVAN called Roberth at [facility] and left a HIPAA compliant voicemail requesting a return phone call.    0906: Return phone call received from Roberth at [facility]. She can assess the patient today but reported the facility administration believes the patient needs short term rehabilitation. Referrals were sent to short term rehabilitation facilities on February 20, 2024 and did not receive any bed offers.    0944: RN CM left a HIPAA compliant voicemail with Marie, the liaison at [facility], requesting a return phone call.

## 2024-02-23 NOTE — CARE COORDINATION
1328:ALEX DONOVAN spoke with Roberth from [facility]. She confirmed the patient can return to their facility if the patient discharges to their skilled nursing unit. Per her report, she informed the patient's family that they will have to pay out of pocket for long term care. She is going to have a representative with long term care at [facility] contact case management today.    1408: RN CM and the attending MD spoke with the patient's daughter Marie over the telephone and discussed discharge planning. She is interested in moving the patient to a different facility and is aware the patient is medically ready for discharge. She confirmed she spoke with [facility] and wants to take her time locating a different facility. She stated she spoke with [facility] and discussed the patient discharging to the skilled nursing side. Per her report, the facility is giving her a 30 day notice. RN CM discussed the patient returning to [facility] while she is looking for a different facility. She stated she is going to call [facility] and will call case management back later today.

## 2024-02-23 NOTE — CARE COORDINATION
Discharge planning was discussed with the Critical Care Interdisciplinary Team. The patient is medically ready for discharge and is a potential discharge back to her memory care unit. She requires an evaluation from her memory care unit prior to discharge. She is pending a repeat physical therapy evaluation today.

## 2024-02-23 NOTE — CARE COORDINATION
ALEX DONOVAN spoke with Pascale from [facility]. She confirmed the patient has been approved for services with a Monday admission. She stated she spoke with the patient's daughter Marie but [facility] has not yet accepted them for services. Per her report, she informed Marie that they are limited on beds and need a decision quickly. Pascale sent the admission paperwork to case management.    The patient is medically ready for discharge.    Discharge planning was discussed with the attending physician. The admission paperwork for [facility] was given to the attending physician.    Phone call received from Roberth at [facility]. She is on her way to assess the patient.

## 2024-02-23 NOTE — CARE COORDINATION
Discharge planning was discussed with the primary nurse. Per the primary nurse, the patient was evaluated by a liaison from [facility].    Message received from Roni with [facility]. She stated she spoke with the patient's daughter Marie. Per Roni, [facility] can potentially accept the patient on Monday, February 26, 2024.

## 2024-02-23 NOTE — CARE COORDINATION
Family apparently contacted another Memory care facility -[facility] and asked them to evaluate for a transfer.   I met their DON in the [place] who had just approved her admission to their facility. I gave her Gisell Cano's contact info who is assigned to this pt. She will email and fax over their required paperwork. They will not allow a new weekend admission but are prepared for this Monday if medically ready. Will need to verify plan with family. Requesting PPD and COVID if not already completed.  Gisell GOMEZ updated.

## 2024-02-24 PROCEDURE — 6370000000 HC RX 637 (ALT 250 FOR IP): Performed by: STUDENT IN AN ORGANIZED HEALTH CARE EDUCATION/TRAINING PROGRAM

## 2024-02-24 PROCEDURE — 6370000000 HC RX 637 (ALT 250 FOR IP): Performed by: HOSPITALIST

## 2024-02-24 PROCEDURE — 1100000000 HC RM PRIVATE

## 2024-02-24 PROCEDURE — 97530 THERAPEUTIC ACTIVITIES: CPT

## 2024-02-24 RX ADMIN — ATORVASTATIN CALCIUM 40 MG: 40 TABLET, FILM COATED ORAL at 21:43

## 2024-02-24 RX ADMIN — MEMANTINE 5 MG: 5 TABLET ORAL at 21:43

## 2024-02-24 RX ADMIN — AMLODIPINE BESYLATE 5 MG: 5 TABLET ORAL at 08:02

## 2024-02-24 RX ADMIN — SERTRALINE HYDROCHLORIDE 50 MG: 50 TABLET ORAL at 08:02

## 2024-02-24 RX ADMIN — QUETIAPINE FUMARATE 12.5 MG: 25 TABLET ORAL at 08:02

## 2024-02-24 RX ADMIN — MEMANTINE 5 MG: 5 TABLET ORAL at 08:02

## 2024-02-24 RX ADMIN — QUETIAPINE FUMARATE 25 MG: 25 TABLET ORAL at 21:42

## 2024-02-24 ASSESSMENT — PAIN SCALES - GENERAL
PAINLEVEL_OUTOF10: 0
PAINLEVEL_OUTOF10: 0

## 2024-02-25 PROCEDURE — 6370000000 HC RX 637 (ALT 250 FOR IP): Performed by: HOSPITALIST

## 2024-02-25 PROCEDURE — 1100000000 HC RM PRIVATE

## 2024-02-25 PROCEDURE — 6370000000 HC RX 637 (ALT 250 FOR IP): Performed by: STUDENT IN AN ORGANIZED HEALTH CARE EDUCATION/TRAINING PROGRAM

## 2024-02-25 RX ADMIN — AMLODIPINE BESYLATE 5 MG: 5 TABLET ORAL at 08:57

## 2024-02-25 RX ADMIN — MEMANTINE 5 MG: 5 TABLET ORAL at 21:31

## 2024-02-25 RX ADMIN — ATORVASTATIN CALCIUM 40 MG: 40 TABLET, FILM COATED ORAL at 21:31

## 2024-02-25 RX ADMIN — MEMANTINE 5 MG: 5 TABLET ORAL at 08:57

## 2024-02-25 RX ADMIN — QUETIAPINE FUMARATE 12.5 MG: 25 TABLET ORAL at 08:57

## 2024-02-25 RX ADMIN — QUETIAPINE FUMARATE 25 MG: 25 TABLET ORAL at 21:31

## 2024-02-25 RX ADMIN — SERTRALINE HYDROCHLORIDE 50 MG: 50 TABLET ORAL at 08:57

## 2024-02-25 ASSESSMENT — PAIN SCALES - GENERAL
PAINLEVEL_OUTOF10: 0
PAINLEVEL_OUTOF10: 0

## 2024-02-26 VITALS
HEART RATE: 72 BPM | RESPIRATION RATE: 16 BRPM | DIASTOLIC BLOOD PRESSURE: 64 MMHG | OXYGEN SATURATION: 99 % | WEIGHT: 94.6 LBS | SYSTOLIC BLOOD PRESSURE: 136 MMHG | TEMPERATURE: 97.1 F | BODY MASS INDEX: 18.57 KG/M2 | HEIGHT: 60 IN

## 2024-02-26 PROCEDURE — 6370000000 HC RX 637 (ALT 250 FOR IP): Performed by: STUDENT IN AN ORGANIZED HEALTH CARE EDUCATION/TRAINING PROGRAM

## 2024-02-26 PROCEDURE — 6370000000 HC RX 637 (ALT 250 FOR IP): Performed by: HOSPITALIST

## 2024-02-26 PROCEDURE — 97530 THERAPEUTIC ACTIVITIES: CPT

## 2024-02-26 RX ORDER — ACETAMINOPHEN 500 MG
500 TABLET ORAL EVERY 8 HOURS PRN
Qty: 120 TABLET | Refills: 0 | Status: SHIPPED | OUTPATIENT
Start: 2024-02-26

## 2024-02-26 RX ORDER — QUETIAPINE FUMARATE 25 MG/1
25 TABLET, FILM COATED ORAL NIGHTLY
Qty: 60 TABLET | Refills: 0 | Status: SHIPPED | OUTPATIENT
Start: 2024-02-26

## 2024-02-26 RX ORDER — ATORVASTATIN CALCIUM 40 MG/1
40 TABLET, FILM COATED ORAL NIGHTLY
Qty: 30 TABLET | Refills: 0 | Status: SHIPPED | OUTPATIENT
Start: 2024-02-26 | End: 2024-02-26

## 2024-02-26 RX ORDER — QUETIAPINE FUMARATE 25 MG/1
12.5 TABLET, FILM COATED ORAL DAILY
Qty: 60 TABLET | Refills: 0 | Status: SHIPPED | OUTPATIENT
Start: 2024-02-27 | End: 2024-02-26

## 2024-02-26 RX ORDER — ATORVASTATIN CALCIUM 40 MG/1
40 TABLET, FILM COATED ORAL NIGHTLY
Qty: 30 TABLET | Refills: 0 | Status: SHIPPED | OUTPATIENT
Start: 2024-02-26

## 2024-02-26 RX ORDER — MEMANTINE HYDROCHLORIDE 10 MG/1
10 TABLET ORAL 2 TIMES DAILY
Qty: 60 TABLET | Refills: 0 | Status: SHIPPED | OUTPATIENT
Start: 2024-02-26

## 2024-02-26 RX ORDER — ACETAMINOPHEN 160 MG
2000 TABLET,DISINTEGRATING ORAL DAILY
Qty: 30 CAPSULE | Refills: 0 | Status: SHIPPED | OUTPATIENT
Start: 2024-02-26

## 2024-02-26 RX ORDER — QUETIAPINE FUMARATE 25 MG/1
12.5 TABLET, FILM COATED ORAL DAILY
Qty: 60 TABLET | Refills: 0 | Status: SHIPPED | OUTPATIENT
Start: 2024-02-27

## 2024-02-26 RX ORDER — QUETIAPINE FUMARATE 25 MG/1
25 TABLET, FILM COATED ORAL NIGHTLY
Qty: 60 TABLET | Refills: 0 | Status: SHIPPED | OUTPATIENT
Start: 2024-02-26 | End: 2024-02-26

## 2024-02-26 RX ORDER — AMLODIPINE BESYLATE 10 MG/1
10 TABLET ORAL DAILY
Qty: 30 TABLET | Refills: 0 | Status: SHIPPED | OUTPATIENT
Start: 2024-02-26

## 2024-02-26 RX ADMIN — AMLODIPINE BESYLATE 5 MG: 5 TABLET ORAL at 09:15

## 2024-02-26 RX ADMIN — MEMANTINE 5 MG: 5 TABLET ORAL at 09:15

## 2024-02-26 RX ADMIN — QUETIAPINE FUMARATE 12.5 MG: 25 TABLET ORAL at 09:16

## 2024-02-26 RX ADMIN — SERTRALINE HYDROCHLORIDE 50 MG: 50 TABLET ORAL at 09:15

## 2024-02-26 ASSESSMENT — PAIN SCALES - GENERAL: PAINLEVEL_OUTOF10: 0

## 2024-02-26 NOTE — CARE COORDINATION
ALEX DONOVAN spoke with Everette at [facility]. She confirmed the patient can admit to the facility today. She prefers for the patient to admit no later than 1400. Per her report, the family is coming to the facility this morning to complete admission paperwork. The facility requests for prescriptions to be faxed to [phone]. The fax number was confirmed with read back. The facility needs the prescriptions prior to the patient arriving to their facility.    Discharge planning was discussed with the attending physician.     RN CM called the patient's daughter Marie Desai at [phone]. She confirmed she is in agreement for the patient to discharge to [facility] today. Per her report, her spouse is in the process of moving the patient's furniture from [facility] to [facility]. The Important Message from Medicare letter was explained to Marie. She verbalized understanding and agreement with the information. The patient was provided with a copy of the letter and the original was placed in the patient's chart.     Medical transport was arranged for today at 1330. The attending physician and Pascale at [facility] were notified of the anticipated transport time.    The AVS and Discharge Summary were faxed in Epic to [facility], [phone]. The prescriptions were faxed to the facility and were placed in the patient's discharge packet. The faxes failed. Message received stating the fax number is invalid.The prescriptions were sent to Pascale Urbina at [facility] by \"safemail\". Discharge planning was discussed with the primary nurse.     The primary nurse can call report to Everette Lacy [phone].    The AVS and Discharge Summary were faxed to the fax number on Pascale Urbina's email address from the [facility], [phone].

## 2024-02-26 NOTE — CARE COORDINATION
ALEX DONOVAN spoke with Everette at [facility]. She confirmed they received the orders and prescriptions and the patient's furniture has been moved into their facility.

## 2024-02-26 NOTE — DISCHARGE SUMMARY
Hospitalist Discharge Summary   Admit Date:  2/15/2024  4:06 PM   DC Note date: 2024  Name:  Alexys Zamora   Age:  88 y.o.  Sex:  female  :  1935   MRN:  [id number]   Room:  [facility]  PCP:  Heena Rasmussen MD    Presenting Complaint: Aggressive Behavior     Initial Admission Diagnosis: Subdural hematoma (HCC) [S06.5XAA]  MARY (acute kidney injury) (HCC) [N17.9]  Urinary tract infection without hematuria, site unspecified [N39.0]  Altered mental status, unspecified altered mental status type [R41.82]  Moderate dementia with agitation, unspecified dementia type (HCC) [F03.B11]     Problem List for this Hospitalization (present on admission):    Principal Problem:    Subdural hematoma (HCC)  Active Problems:    Pure hypercholesterolemia    S/P prosthetic total arthroplasty of the hip    Essential hypertension with goal blood pressure less than 140/90    Osteoarthritis of hip    Reflux esophagitis    Dementia with behavioral disturbance (HCC)  Resolved Problems:    * No resolved hospital problems. *      Hospital Course:  Alexys Zamora is a 88 y.o. female with medical history of dementia currently in memory care unit, was brought to the ER with agitated behavior for the past 10 days.  Per family, patient has been having falls with documented fall few days ago hitting her head.  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  Neurosurgery was consulted.  Recommended repeating CT head in the next morning which showed stable findings.  Does not recommend neurosurgical intervention at this time given her age and other comorbidities she is a poor surgical candidate. Pt worked with PT/OT and continued to improve. She was stable for discharge to [facility] on 24.     Disposition: [facility]  Diet: ADULT DIET; Easy to Chew  ADULT ORAL NUTRITION SUPPLEMENT; Breakfast, Lunch; Standard High Calorie/High Protein Oral Supplement  ADULT ORAL

## 2024-02-26 NOTE — PLAN OF CARE
Problem: Discharge Planning  Goal: Discharge to home or other facility with appropriate resources  2/19/2024 2259 by Kassie Burroughs RN  Outcome: Not Progressing  Flowsheets (Taken 2/19/2024 2115)  Discharge to home or other facility with appropriate resources: Identify barriers to discharge with patient and caregiver  2/19/2024 1012 by Dunia Leiva RN  Outcome: Progressing  Flowsheets (Taken 2/19/2024 0809)  Discharge to home or other facility with appropriate resources:   Identify barriers to discharge with patient and caregiver   Arrange for needed discharge resources and transportation as appropriate   Identify discharge learning needs (meds, wound care, etc)   Refer to discharge planning if patient needs post-hospital services based on physician order or complex needs related to functional status, cognitive ability or social support system     Problem: Risk for Elopement  Goal: Patient will not exit the unit/facility without proper excort  2/19/2024 2259 by Kassie Burroughs RN  Outcome: Not Progressing  Flowsheets (Taken 2/19/2024 2115)  Nursing Interventions for Elopement Risk: Assist with personal care needs such as toileting, eating, dressing, as needed to reduce the risk of wandering  2/19/2024 1012 by Dunia Leiva RN  Outcome: Progressing  Flowsheets (Taken 2/19/2024 0809)  Nursing Interventions for Elopement Risk:   Assist with personal care needs such as toileting, eating, dressing, as needed to reduce the risk of wandering   Collaborate with family members/caregivers to mitigate the elopement risk     Problem: Confusion  Goal: Confusion, delirium, dementia, or psychosis is improved or at baseline  Description: INTERVENTIONS:  1. Assess for possible contributors to thought disturbance, including medications, impaired vision or hearing, underlying metabolic abnormalities, dehydration, psychiatric diagnoses, and notify attending LIP  2. [place] high risk fall precautions, as 
  Problem: Discharge Planning  Goal: Discharge to home or other facility with appropriate resources  2/24/2024 2306 by Kassie Burroughs RN  Outcome: Progressing  Flowsheets (Taken 2/24/2024 2145)  Discharge to home or other facility with appropriate resources: Identify barriers to discharge with patient and caregiver  2/24/2024 0911 by Deo Nesbitt RN  Outcome: Progressing  Flowsheets (Taken 2/24/2024 0730)  Discharge to home or other facility with appropriate resources:   Identify barriers to discharge with patient and caregiver   Arrange for needed discharge resources and transportation as appropriate   Identify discharge learning needs (meds, wound care, etc)   Refer to discharge planning if patient needs post-hospital services based on physician order or complex needs related to functional status, cognitive ability or social support system     Problem: Pain  Goal: Verbalizes/displays adequate comfort level or baseline comfort level  2/24/2024 2306 by Kassie Burroughs RN  Outcome: Progressing  2/24/2024 0911 by Deo Nesbitt RN  Outcome: Progressing  Flowsheets (Taken 2/24/2024 0730)  Verbalizes/displays adequate comfort level or baseline comfort level:   Encourage patient to monitor pain and request assistance   Assess pain using appropriate pain scale   Administer analgesics based on type and severity of pain and evaluate response   Implement non-pharmacological measures as appropriate and evaluate response     Problem: Risk for Elopement  Goal: Patient will not exit the unit/facility without proper excort  2/24/2024 2306 by Kassie Burroughs RN  Outcome: Progressing  Flowsheets (Taken 2/24/2024 2145)  Nursing Interventions for Elopement Risk: Assist with personal care needs such as toileting, eating, dressing, as needed to reduce the risk of wandering  2/24/2024 0911 by Deo Nesbitt RN  Outcome: Progressing  Flowsheets (Taken 2/24/2024 0730)  Nursing Interventions for Elopement Risk: 
  Problem: Discharge Planning  Goal: Discharge to home or other facility with appropriate resources  2/25/2024 2204 by Kassie Burroughs RN  Outcome: Progressing  Flowsheets (Taken 2/25/2024 2130)  Discharge to home or other facility with appropriate resources: Identify barriers to discharge with patient and caregiver  2/25/2024 0819 by Deo Nesbitt RN  Outcome: Progressing     Problem: Pain  Goal: Verbalizes/displays adequate comfort level or baseline comfort level  2/25/2024 2204 by Kassie Burroughs RN  Outcome: Progressing  2/25/2024 0819 by Deo Nesbitt RN  Outcome: Progressing  Flowsheets (Taken 2/25/2024 0742)  Verbalizes/displays adequate comfort level or baseline comfort level:   Encourage patient to monitor pain and request assistance   Assess pain using appropriate pain scale   Administer analgesics based on type and severity of pain and evaluate response   Implement non-pharmacological measures as appropriate and evaluate response     Problem: Risk for Elopement  Goal: Patient will not exit the unit/facility without proper excort  2/25/2024 2204 by Kassie Burroughs RN  Outcome: Progressing  Flowsheets (Taken 2/25/2024 2130)  Nursing Interventions for Elopement Risk: Assist with personal care needs such as toileting, eating, dressing, as needed to reduce the risk of wandering  2/25/2024 0819 by Deo Nesbitt RN  Outcome: Progressing  Flowsheets (Taken 2/25/2024 0742)  Nursing Interventions for Elopement Risk: Assist with personal care needs such as toileting, eating, dressing, as needed to reduce the risk of wandering     Problem: Skin/Tissue Integrity  Goal: Absence of new skin breakdown  Description: 1.  Monitor for areas of redness and/or skin breakdown  2.  Assess vascular access sites hourly  3.  Every 4-6 hours minimum:  Change oxygen saturation probe site  4.  Every 4-6 hours:  If on nasal continuous positive airway pressure, respiratory therapy assess nares and determine need 
  Problem: Discharge Planning  Goal: Discharge to home or other facility with appropriate resources  2/26/2024 0945 by Dunia Leiva RN  Outcome: Progressing  Flowsheets (Taken 2/26/2024 0745)  Discharge to home or other facility with appropriate resources:   Identify barriers to discharge with patient and caregiver   Arrange for needed discharge resources and transportation as appropriate   Identify discharge learning needs (meds, wound care, etc)   Refer to discharge planning if patient needs post-hospital services based on physician order or complex needs related to functional status, cognitive ability or social support system  2/25/2024 2204 by Kassie Burroughs, RN  Outcome: Progressing  Flowsheets (Taken 2/25/2024 2130)  Discharge to home or other facility with appropriate resources: Identify barriers to discharge with patient and caregiver     Problem: Pain  Goal: Verbalizes/displays adequate comfort level or baseline comfort level  2/26/2024 0945 by Dunia Leiva RN  Outcome: Progressing  2/25/2024 2204 by Kassie Burroughs RN  Outcome: Progressing     Problem: Risk for Elopement  Goal: Patient will not exit the unit/facility without proper excort  2/26/2024 0945 by Dunia Leiva RN  Outcome: Progressing  Flowsheets (Taken 2/26/2024 0745)  Nursing Interventions for Elopement Risk:   Assist with personal care needs such as toileting, eating, dressing, as needed to reduce the risk of wandering   Collaborate with family members/caregivers to mitigate the elopement risk  2/25/2024 2204 by Kassie Burroughs RN  Outcome: Progressing  Flowsheets (Taken 2/25/2024 2130)  Nursing Interventions for Elopement Risk: Assist with personal care needs such as toileting, eating, dressing, as needed to reduce the risk of wandering     Problem: Skin/Tissue Integrity  Goal: Absence of new skin breakdown  Description: 1.  Monitor for areas of redness and/or skin breakdown  2.  Assess vascular access sites 
  Problem: Discharge Planning  Goal: Discharge to home or other facility with appropriate resources  Outcome: Not Progressing  Flowsheets  Taken 2/16/2024 0110  Discharge to home or other facility with appropriate resources: Identify barriers to discharge with patient and caregiver  Taken 2/16/2024 0000  Discharge to home or other facility with appropriate resources: Identify barriers to discharge with patient and caregiver     Problem: Pain  Goal: Verbalizes/displays adequate comfort level or baseline comfort level  Outcome: Not Progressing     Problem: Risk for Elopement  Goal: Patient will not exit the unit/facility without proper excort  Outcome: Not Progressing  Flowsheets  Taken 2/16/2024 0000 by Kassie Burroughs, RN  Nursing Interventions for Elopement Risk:   Assist with personal care needs such as toileting, eating, dressing, as needed to reduce the risk of wandering   Collaborate with family members/caregivers to mitigate the elopement risk  Taken 2/15/2024 1622 by Christian Adame, RN  Nursing Interventions for Elopement Risk:   Collaborate with family members/caregivers to mitigate the elopement risk   Communicate/escalate to /other team member the risk of elopement   Communicate/escalate to charge nurse the risk of elopement   Communicate to physician the risk for elopement   Reduce environmental triggers     Problem: Skin/Tissue Integrity  Goal: Absence of new skin breakdown  Description: 1.  Monitor for areas of redness and/or skin breakdown  2.  Assess vascular access sites hourly  3.  Every 4-6 hours minimum:  Change oxygen saturation probe site  4.  Every 4-6 hours:  If on nasal continuous positive airway pressure, respiratory therapy assess nares and determine need for appliance change or resting period.  Outcome: Not Progressing     Problem: ABCDS Injury Assessment  Goal: Absence of physical injury  Outcome: Not Progressing     Problem: Safety - Adult  Goal: Free from fall 
  Problem: Discharge Planning  Goal: Discharge to home or other facility with appropriate resources  Outcome: Progressing  Flowsheets (Taken 2/18/2024 0740)  Discharge to home or other facility with appropriate resources:   Identify barriers to discharge with patient and caregiver   Arrange for needed discharge resources and transportation as appropriate   Identify discharge learning needs (meds, wound care, etc)   Refer to discharge planning if patient needs post-hospital services based on physician order or complex needs related to functional status, cognitive ability or social support system     Problem: Pain  Goal: Verbalizes/displays adequate comfort level or baseline comfort level  Outcome: Progressing  Flowsheets (Taken 2/18/2024 0740)  Verbalizes/displays adequate comfort level or baseline comfort level:   Encourage patient to monitor pain and request assistance   Assess pain using appropriate pain scale   Administer analgesics based on type and severity of pain and evaluate response   Implement non-pharmacological measures as appropriate and evaluate response   Notify Licensed Independent Practitioner if interventions unsuccessful or patient reports new pain     Problem: Risk for Elopement  Goal: Patient will not exit the unit/facility without proper excort  Outcome: Progressing  Flowsheets (Taken 2/18/2024 0740)  Nursing Interventions for Elopement Risk:   Assist with personal care needs such as toileting, eating, dressing, as needed to reduce the risk of wandering   Collaborate with family members/caregivers to mitigate the elopement risk   Collaborate with treatment team for drug withdrawal symptoms treatment   Collaborate with treatment team for nicotine replacement     Problem: Skin/Tissue Integrity  Goal: Absence of new skin breakdown  Description: 1.  Monitor for areas of redness and/or skin breakdown  2.  Assess vascular access sites hourly  3.  Every 4-6 hours minimum:  Change oxygen saturation 
  Problem: Discharge Planning  Goal: Discharge to home or other facility with appropriate resources  Outcome: Progressing  Flowsheets (Taken 2/19/2024 0809)  Discharge to home or other facility with appropriate resources:   Identify barriers to discharge with patient and caregiver   Arrange for needed discharge resources and transportation as appropriate   Identify discharge learning needs (meds, wound care, etc)   Refer to discharge planning if patient needs post-hospital services based on physician order or complex needs related to functional status, cognitive ability or social support system     Problem: Pain  Goal: Verbalizes/displays adequate comfort level or baseline comfort level  Outcome: Progressing  Flowsheets (Taken 2/19/2024 0809)  Verbalizes/displays adequate comfort level or baseline comfort level:   Encourage patient to monitor pain and request assistance   Assess pain using appropriate pain scale   Administer analgesics based on type and severity of pain and evaluate response   Implement non-pharmacological measures as appropriate and evaluate response   Consider cultural and social influences on pain and pain management   Notify Licensed Independent Practitioner if interventions unsuccessful or patient reports new pain     Problem: Risk for Elopement  Goal: Patient will not exit the unit/facility without proper excort  Outcome: Progressing  Flowsheets (Taken 2/19/2024 0809)  Nursing Interventions for Elopement Risk:   Assist with personal care needs such as toileting, eating, dressing, as needed to reduce the risk of wandering   Collaborate with family members/caregivers to mitigate the elopement risk     Problem: Skin/Tissue Integrity  Goal: Absence of new skin breakdown  Description: 1.  Monitor for areas of redness and/or skin breakdown  2.  Assess vascular access sites hourly  3.  Every 4-6 hours minimum:  Change oxygen saturation probe site  4.  Every 4-6 hours:  If on nasal continuous positive 
  Problem: Discharge Planning  Goal: Discharge to home or other facility with appropriate resources  Outcome: Progressing  Flowsheets (Taken 2/22/2024 0730)  Discharge to home or other facility with appropriate resources:   Identify barriers to discharge with patient and caregiver   Arrange for needed discharge resources and transportation as appropriate   Identify discharge learning needs (meds, wound care, etc)   Refer to discharge planning if patient needs post-hospital services based on physician order or complex needs related to functional status, cognitive ability or social support system     Problem: Pain  Goal: Verbalizes/displays adequate comfort level or baseline comfort level  Outcome: Progressing  Flowsheets (Taken 2/22/2024 0721)  Verbalizes/displays adequate comfort level or baseline comfort level:   Encourage patient to monitor pain and request assistance   Assess pain using appropriate pain scale   Administer analgesics based on type and severity of pain and evaluate response   Implement non-pharmacological measures as appropriate and evaluate response   Consider cultural and social influences on pain and pain management   Notify Licensed Independent Practitioner if interventions unsuccessful or patient reports new pain     Problem: Risk for Elopement  Goal: Patient will not exit the unit/facility without proper excort  Outcome: Progressing  Flowsheets (Taken 2/22/2024 0730)  Nursing Interventions for Elopement Risk:   Assist with personal care needs such as toileting, eating, dressing, as needed to reduce the risk of wandering   Collaborate with family members/caregivers to mitigate the elopement risk   Collaborate with treatment team for drug withdrawal symptoms treatment   Collaborate with treatment team for nicotine replacement     Problem: Skin/Tissue Integrity  Goal: Absence of new skin breakdown  Description: 1.  Monitor for areas of redness and/or skin breakdown  2.  Assess vascular access 
  Problem: Discharge Planning  Goal: Discharge to home or other facility with appropriate resources  Outcome: Progressing  Flowsheets (Taken 2/22/2024 2230)  Discharge to home or other facility with appropriate resources: Identify barriers to discharge with patient and caregiver     Problem: Pain  Goal: Verbalizes/displays adequate comfort level or baseline comfort level  Outcome: Progressing     Problem: Risk for Elopement  Goal: Patient will not exit the unit/facility without proper excort  Outcome: Progressing  Flowsheets (Taken 2/22/2024 2230)  Nursing Interventions for Elopement Risk: Assist with personal care needs such as toileting, eating, dressing, as needed to reduce the risk of wandering     Problem: Skin/Tissue Integrity  Goal: Absence of new skin breakdown  Description: 1.  Monitor for areas of redness and/or skin breakdown  2.  Assess vascular access sites hourly  3.  Every 4-6 hours minimum:  Change oxygen saturation probe site  4.  Every 4-6 hours:  If on nasal continuous positive airway pressure, respiratory therapy assess nares and determine need for appliance change or resting period.  Outcome: Progressing     Problem: ABCDS Injury Assessment  Goal: Absence of physical injury  Outcome: Progressing     Problem: Safety - Adult  Goal: Free from fall injury  Outcome: Progressing     Problem: Safety - Medical Restraint  Goal: Remains free of injury from restraints (Restraint for Interference with Medical Device)  Description: INTERVENTIONS:  1. Determine that other, less restrictive measures have been tried or would not be effective before applying the restraint  2. Evaluate the patient's condition at the time of restraint application  3. Inform patient/family regarding the reason for restraint  4. Q2H: Monitor safety, psychosocial status, comfort, nutrition and hydration  Outcome: Completed     Problem: Confusion  Goal: Confusion, delirium, dementia, or psychosis is improved or at 
Patient remains out of restraints. Resting in bed on assessment.     Problem: Discharge Planning  Goal: Discharge to home or other facility with appropriate resources  Outcome: Progressing  Flowsheets (Taken 2/21/2024 0725)  Discharge to home or other facility with appropriate resources: Identify barriers to discharge with patient and caregiver     Problem: Pain  Goal: Verbalizes/displays adequate comfort level or baseline comfort level  Outcome: Progressing     Problem: Risk for Elopement  Goal: Patient will not exit the unit/facility without proper excort  Outcome: Progressing  Flowsheets (Taken 2/21/2024 0725)  Nursing Interventions for Elopement Risk: Assist with personal care needs such as toileting, eating, dressing, as needed to reduce the risk of wandering     Problem: Skin/Tissue Integrity  Goal: Absence of new skin breakdown  Description: 1.  Monitor for areas of redness and/or skin breakdown  2.  Assess vascular access sites hourly  3.  Every 4-6 hours minimum:  Change oxygen saturation probe site  4.  Every 4-6 hours:  If on nasal continuous positive airway pressure, respiratory therapy assess nares and determine need for appliance change or resting period.  Outcome: Progressing     Problem: ABCDS Injury Assessment  Goal: Absence of physical injury  Outcome: Progressing     Problem: Safety - Adult  Goal: Free from fall injury  Outcome: Progressing     Problem: Confusion  Goal: Confusion, delirium, dementia, or psychosis is improved or at baseline  Description: INTERVENTIONS:  1. Assess for possible contributors to thought disturbance, including medications, impaired vision or hearing, underlying metabolic abnormalities, dehydration, psychiatric diagnoses, and notify attending LIP  2. [place] high risk fall precautions, as indicated  3. Provide frequent short contacts to provide reality reorientation, refocusing and direction  4. Decrease environmental stimuli, including noise as appropriate  5. Monitor 
Assist with personal care needs such as toileting, eating, dressing, as needed to reduce the risk of wandering   Collaborate with family members/caregivers to mitigate the elopement risk  Taken 2/15/2024 1622 by Christian Adame RN  Nursing Interventions for Elopement Risk:   Collaborate with family members/caregivers to mitigate the elopement risk   Communicate/escalate to /other team member the risk of elopement   Communicate/escalate to charge nurse the risk of elopement   Communicate to physician the risk for elopement   Reduce environmental triggers     Problem: Skin/Tissue Integrity  Goal: Absence of new skin breakdown  Description: 1.  Monitor for areas of redness and/or skin breakdown  2.  Assess vascular access sites hourly  3.  Every 4-6 hours minimum:  Change oxygen saturation probe site  4.  Every 4-6 hours:  If on nasal continuous positive airway pressure, respiratory therapy assess nares and determine need for appliance change or resting period.  2/16/2024 0846 by Deo Nesbitt RN  Outcome: Progressing  2/16/2024 0113 by Kassie Burroughs RN  Outcome: Not Progressing     Problem: ABCDS Injury Assessment  Goal: Absence of physical injury  2/16/2024 0846 by Deo Nesbitt RN  Outcome: Progressing  Flowsheets (Taken 2/16/2024 0730)  Absence of Physical Injury: Implement safety measures based on patient assessment  2/16/2024 0113 by Kassie Burroughs RN  Outcome: Not Progressing     Problem: Safety - Adult  Goal: Free from fall injury  2/16/2024 0846 by Deo Nesbitt RN  Outcome: Progressing  Flowsheets (Taken 2/16/2024 0730)  Free From Fall Injury: Instruct family/caregiver on patient safety  2/16/2024 0113 by Kassie Burroughs RN  Outcome: Not Progressing     Problem: Confusion  Goal: Confusion, delirium, dementia, or psychosis is improved or at baseline  Description: INTERVENTIONS:  1. Assess for possible contributors to thought disturbance, including medications, impaired 
psychiatric diagnoses, notify [facility] high risk fall precautions, as indicated   Provide frequent short contacts to provide reality reorientation, refocusing and direction   Decrease environmental stimuli, including noise as appropriate   Monitor and intervene to maintain adequate nutrition, hydration, elimination, sleep and activity   If unable to ensure safety without constant attention obtain sitter and review sitter guidelines with assigned personnel  2/19/2024 2259 by Kassie Burroughs, RN  Outcome: Not Progressing  Flowsheets (Taken 2/19/2024 2115)  Effect of thought disturbance (confusion, delirium, dementia, or psychosis) are managed with adequate functional status:   Assess for contributors to thought disturbance, including medications, impaired vision or hearing, underlying metabolic abnormalities, dehydration, psychiatric diagnoses, notify [facility] high risk fall precautions, as indicated     Problem: Safety - Medical Restraint  Goal: Remains free of injury from restraints (Restraint for Interference with Medical Device)  Description: INTERVENTIONS:  1. Determine that other, less restrictive measures have been tried or would not be effective before applying the restraint  2. Evaluate the patient's condition at the time of restraint application  3. Inform patient/family regarding the reason for restraint  4. Q2H: Monitor safety, psychosocial status, comfort, nutrition and hydration  2/20/2024 1028 by Micheal Lazo, RN  Outcome: Progressing  2/19/2024 2259 by Kassie Burroughs, RN  Outcome: Not Progressing  Flowsheets  Taken 2/19/2024 2000 by Kassie Burroughs, RN  Remains free of injury from restraints (restraint for interference with medical device):   Determine that other, less restrictive measures have been tried or would not be effective before applying the restraint   Evaluate the patient's condition at the time of restraint application   Every 2 hours: Monitor safety, 
respiratory therapy assess nares and determine need for appliance change or resting period.  2/24/2024 0004 by Kassie Burroughs RN  Outcome: Progressing  2/24/2024 0003 by Kassie Burroughs RN  Outcome: Progressing     Problem: ABCDS Injury Assessment  Goal: Absence of physical injury  2/24/2024 0004 by Kassie Burroughs RN  Outcome: Progressing  2/24/2024 0003 by Kassie Burroughs RN  Outcome: Progressing     Problem: Safety - Medical Restraint  Goal: Remains free of injury from restraints (Restraint for Interference with Medical Device)  Description: INTERVENTIONS:  1. Determine that other, less restrictive measures have been tried or would not be effective before applying the restraint  2. Evaluate the patient's condition at the time of restraint application  3. Inform patient/family regarding the reason for restraint  4. Q2H: Monitor safety, psychosocial status, comfort, nutrition and hydration  Recent Flowsheet Documentation  Taken 2/23/2024 1400 by Shalini Arechiga RN  Remains free of injury from restraints (restraint for interference with medical device):   Every 2 hours: Monitor safety, psychosocial status, comfort, nutrition and hydration   Determine that other, less restrictive measures have been tried or would not be effective before applying the restraint   Evaluate the patient's condition at the time of restraint application     Problem: Safety - Adult  Goal: Free from fall injury  2/24/2024 0004 by Kassie Burroughs RN  Outcome: Not Progressing  2/24/2024 0003 by Kassie Burroughs RN  Outcome: Not Progressing     Problem: Confusion  Goal: Confusion, delirium, dementia, or psychosis is improved or at baseline  Description: INTERVENTIONS:  1. Assess for possible contributors to thought disturbance, including medications, impaired vision or hearing, underlying metabolic abnormalities, dehydration, psychiatric diagnoses, and notify attending LIP  2. [place] high risk fall 
restraint  2. Evaluate the patient's condition at the time of restraint application  3. Inform patient/family regarding the reason for restraint  4. Q2H: Monitor safety, psychosocial status, comfort, nutrition and hydration  Outcome: Progressing     
with adequate functional status:   Assess for contributors to thought disturbance, including medications, impaired vision or hearing, underlying metabolic abnormalities, dehydration, psychiatric diagnoses, notify LIP   [place] high risk fall precautions, as indicated   Provide frequent short contacts to provide reality reorientation, refocusing and direction   Decrease environmental stimuli, including noise as appropriate   Monitor and intervene to maintain adequate nutrition, hydration, elimination, sleep and activity  2/24/2024 2306 by Kassie Burroughs, RN  Outcome: Not Progressing     Problem: Safety - Medical Restraint  Goal: Remains free of injury from restraints (Restraint for Interference with Medical Device)  Description: INTERVENTIONS:  1. Determine that other, less restrictive measures have been tried or would not be effective before applying the restraint  2. Evaluate the patient's condition at the time of restraint application  3. Inform patient/family regarding the reason for restraint  4. Q2H: Monitor safety, psychosocial status, comfort, nutrition and hydration  2/24/2024 2306 by Kassie Burroughs, RN  Outcome: Completed     
patient's condition at the time of restraint application  Taken 2/23/2024 1400 by Shalini Arechiga RN  Remains free of injury from restraints (restraint for interference with medical device):   Every 2 hours: Monitor safety, psychosocial status, comfort, nutrition and hydration   Determine that other, less restrictive measures have been tried or would not be effective before applying the restraint   Evaluate the patient's condition at the time of restraint application

## 2024-02-26 NOTE — CARE COORDINATION
02/26/24 1143   Service Assessment   Cognition Alert   History Provided By Child/Family   Support Systems Family Members   Prior Functional Level Assistance with the following:   Current Functional Level Assistance with the following:   Can patient return to prior living arrangement No   Financial Resources Medicare   Social/Functional History   Type of Home Assisted living  (Memory Care Unit)   Home Equipment Wheelchair-manual;Rollator;Cane;Walker, rolling   ADL Assistance Needs assistance   Discharge Planning   Current Services Prior To Admission Durable Medical Equipment   Current DME Prior to Arrival Walker;Wheelchair;Cane  (rollator)   Potential Assistance Needed Other (Comment)  (memory care unit)   Potential Assistance Purchasing Medications No   Type of Home Care Services None   Patient expects to be discharged to: Assisted living   Services At/After Discharge   Transition of Care Consult (CM Consult) Discharge Planning;Transportation Assistance   Services At/After Discharge Transport  ([facility])   Mode of Transport at Discharge [facility] Transport Time of Discharge 1330     The patient is discharging to [facility]. Medical transport was arranged for today at 1330. The report information was given to the primary nurse. Orders, clinicals, and prescriptions were placed in the patient's discharge packet. The prescriptions were also sent to the facility prior to discharge. The patient's family verbalized understanding and agreement with the discharge plan.

## 2024-02-26 NOTE — PROGRESS NOTES
Hospitalist Progress Note   Admit Date:  2/15/2024  4:06 PM   Name:  Alexys Zamora   Age:  88 y.o.  Sex:  female  :  1935   MRN:  [id number]   Room:  [facility]    Presenting/Chief Complaint: Aggressive Behavior     Reason(s) for Admission: Subdural hematoma (HCC) [S06.5XAA]  MARY (acute kidney injury) (HCC) [N17.9]  Urinary tract infection without hematuria, site unspecified [N39.0]  Altered mental status, unspecified altered mental status type [R41.82]  Moderate dementia with agitation, unspecified dementia type (HCC) [F03.B11]     Hospital Course:   Alexys Zamora is a 88 y.o. female with medical history of dementia currently in memory care unit, was brought to the ER with agitated behavior for the past 10 days.  Per family, patient has been having falls with documented fall few days ago hitting her head.  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  Neurosurgery was consulted.  Recommends repeating CT head this morning which showed stable findings.  Does not recommend neurosurgical intervention at this time given her age and other comorbidities she is a poor surgical candidate.  Risks of Keppra outweigh the benefits and therefore recommends against it.  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  Confirmed CODE STATUS with patient's daughter and is a DNR.        Subjective & 24hr Events:   Patient is following commands this morning.  No fever no chills.  No chest pain or shortness of breath.  No nausea no vomiting.  Able to move all extremities.      Assessment & Plan:     This is a 88-year-old female with    Acute on chronic subdural hematoma  CT head repeated  shows stable findings compared to on admission.  Neurosurgery consulted.  Appreciate recommendations.  No acute neurosurgical intervention recommended given her age and other comorbidities.  Goals of care were discussed with the family.  Confirmed CODE 
       Hospitalist Progress Note   Admit Date:  2/15/2024  4:06 PM   Name:  Alexys Zamora   Age:  88 y.o.  Sex:  female  :  1935   MRN:  [id number]   Room:  [facility]    Presenting/Chief Complaint: Aggressive Behavior     Reason(s) for Admission: Subdural hematoma (HCC) [S06.5XAA]  MARY (acute kidney injury) (HCC) [N17.9]  Urinary tract infection without hematuria, site unspecified [N39.0]  Altered mental status, unspecified altered mental status type [R41.82]  Moderate dementia with agitation, unspecified dementia type (HCC) [F03.B11]     Hospital Course:   Alexys Zamora is a 88 y.o. female with medical history of dementia currently in memory care unit, was brought to the ER with agitated behavior for the past 10 days.  Per family, patient has been having falls with documented fall few days ago hitting her head.  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  Neurosurgery was consulted.  Recommended repeating CT head in the next morning which showed stable findings.  Does not recommend neurosurgical intervention at this time given her age and other comorbidities she is a poor surgical candidate.  Risks of Keppra outweigh the benefits and therefore recommend against it.     Subjective & 24hr Events:   No acute overnight events. Pt resting in bed watching TV this morning. No complaints. More alert and interactive today.     Assessment & Plan:     Acute on chronic subdural hematoma  -Repeat CT showed stable findings  -Neurosurgery recommended no surgical intervention at this time  -Mental and neurologic status is stable; continue to monitor     Acute metabolic encephalopathy  -Likely due to hospital delirium on chronic dementia   -Improved  -Adjusted Seroquel dose to 12.5mg in the AM and 25mg nightly    -Haldol PRN    Alzheimer's dementia with behavioral disturbances  -Continue Seroquel and memantine    Hypertension  -Amlodipine  -BP has 
       Hospitalist Progress Note   Admit Date:  2/15/2024  4:06 PM   Name:  Alexys Zamora   Age:  88 y.o.  Sex:  female  :  1935   MRN:  [id number]   Room:  [facility]    Presenting/Chief Complaint: Aggressive Behavior     Reason(s) for Admission: Subdural hematoma (HCC) [S06.5XAA]  MARY (acute kidney injury) (HCC) [N17.9]  Urinary tract infection without hematuria, site unspecified [N39.0]  Altered mental status, unspecified altered mental status type [R41.82]  Moderate dementia with agitation, unspecified dementia type (HCC) [F03.B11]     Hospital Course:   Alexys Zamora is a 88 y.o. female with medical history of dementia currently in memory care unit, was brought to the ER with agitated behavior for the past 10 days.  Per family, patient has been having falls with documented fall few days ago hitting her head.  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  Neurosurgery was consulted.  Recommended repeating CT head in the next morning which showed stable findings.  Does not recommend neurosurgical intervention at this time given her age and other comorbidities she is a poor surgical candidate.  Risks of Keppra outweigh the benefits and therefore recommend against it.     Subjective & 24hr Events:   Patient agitated yesterday afternoon, but doing much better this morning.  Sitting in the bedside chair eating breakfast.  Calm and cooperative.    Assessment & Plan:     Acute on chronic subdural hematoma  -Repeat CT showed stable findings  -Neurosurgery recommended no surgical intervention at this time  -Mental and neurologic status is stable; continue to monitor     Acute metabolic encephalopathy  -Likely due to hospital delirium on chronic dementia   -Improved  -Continue Seroquel 12.5mg in the AM and 25mg nightly    -Haldol PRN    Alzheimer's dementia with behavioral disturbances  -Continue Seroquel and 
       Hospitalist Progress Note   Admit Date:  2/15/2024  4:06 PM   Name:  Alexys Zamora   Age:  88 y.o.  Sex:  female  :  1935   MRN:  [id number]   Room:  [facility]    Presenting/Chief Complaint: Aggressive Behavior     Reason(s) for Admission: Subdural hematoma (HCC) [S06.5XAA]  MARY (acute kidney injury) (HCC) [N17.9]  Urinary tract infection without hematuria, site unspecified [N39.0]  Altered mental status, unspecified altered mental status type [R41.82]  Moderate dementia with agitation, unspecified dementia type (HCC) [F03.B11]     Hospital Course:   Alexys Zamora is a 88 y.o. female with medical history of dementia currently in memory care unit, was brought to the ER with agitated behavior for the past 10 days.  Per family, patient has been having falls with documented fall few days ago hitting her head.  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  Neurosurgery was consulted.  Recommended repeating CT head in the next morning which showed stable findings.  Does not recommend neurosurgical intervention at this time given her age and other comorbidities she is a poor surgical candidate.  Risks of Keppra outweigh the benefits and therefore recommends against it.     Subjective & 24hr Events:   No acute overnight events.  Patient lying in bed with no complaints. Drowsy this morning.     Assessment & Plan:     Acute on chronic subdural hematoma  -Repeat CT showed stable findings  -Neurosurgery recommended no surgical intervention at this time  -Continue to monitor mental status and monitor for any neurologic changes    Acute metabolic encephalopathy  -Likely due to hospital delirium on chronic dementia   -Continue Seroquel and Haldol    Alzheimer's dementia with behavioral disturbances  -Seroquel, memantine  -Haldol PRN    Hypertension  -Amlodipine    Hyperlipidemia  -Atorvastatin    Mood disorder  -Sertraline    PT/OT evals and 
    SPEECH LANGUAGE PATHOLOGY: DYSPHAGIA Initial Assessment and Discharge    Acknowledge Order  I  Therapy Time  I   Charges     I  Rehab Caseload Tracker      NAME: Alexys Zamora  : 1935  MRN: [id number]    ADMISSION DATE: 2/15/2024  PRIMARY DIAGNOSIS: Subdural hematoma (HCC)    ICD-10: Treatment Diagnosis: R13.11 Dysphagia, Oral Phase    RECOMMENDATIONS   Diet:    Regular Consistency  Thin Liquids    Medication: as tolerated   Compensatory Swallowing Strategies:   Alternate solids and liquids  Assist feed  Upright as possible for all oral intake   Therapeutic Intervention:   Patient/family education   Patient continues to require skilled intervention:  No. Please re-consult if new concerns arise.      Anticipated Discharge Needs: No additional speech therapy is indicated.      ASSESSMENT    Patient presents with oropharyngeal swallow function that is within functional limits. Mildly impulsive and some difficulty with self-feeding. Anticipate needing to assistance with meals.     Recommend easy to chew diet and thin liquids. Medications with liquid wash. Assistance with meals. No additional speech therapy indicate at this time.     GENERAL    Subjective: Patient sitting in bedside chair. Pleasant and cooperative with evaluation    Recent Information/Background: Patient admitted for increased agitation. Slight SDH. Failed swallow screen due to patient refusing to participate    History of Present Injury/Illness: Ms. aZmora  has a past medical history of Arthritis, Chronic pain, GERD (gastroesophageal reflux disease), High cholesterol, Hypertension, and Pure hypercholesterolemia.. She also  has a past surgical history that includes orthopedic surgery (); Total hip arthroplasty (2013); heent (-,1-15); and total hip arthroplasty (-, 7-).  Precautions/Allergies: Codeine and Oxycodone     Observations:  Alertness: Alert  Voice: WFL  Speech: Intelligible    Prior Dysphagia History: None  Prior 
  Physician Progress Note      PATIENT:               HAMILTON VINCENT  CSN #:                  [id number]  :                       1935  ADMIT DATE:       2/15/2024 4:06 PM  DISCH DATE:  RESPONDING  PROVIDER #:        Juan Christiansen DO          QUERY TEXT:    Pt admitted with subdural hematoma. Pt noted to have admission Cr of 1.16 and   Cr down to 0.69 next day. If possible, please document in the progress notes   and discharge summary if you are evaluating and/or treating any of the   following:    The medical record reflects the following:  Risk Factors: Age, Alzheimer's dementia, AMS  Clinical Indicators: Patient with 2/15 admission Cr 1.16 and Cr to 0.69 on     Treatment: IVF, labs    Defined by Kidney Disease Improving Global Outcomes (KDIGO) clinical practice   guideline for acute kidney injury:  -Increase in SCr by greater than or equal to 0.3 mg/dl within 48 hours; or  -Increase or decrease in SCr to greater than or equal to 1.5 times baseline,   which is known or presumed to have occurred within the prior 7 days; or  -Urine volume < 0.5ml/kg/h for 6 hours    Thank you,  Lance Neri RN CDI  [email or web address]  Options provided:  -- Acute kidney injury  -- Other - I will add my own diagnosis  -- Disagree - Not applicable / Not valid  -- Disagree - Clinically unable to determine / Unknown  -- Refer to Clinical Documentation Reviewer    PROVIDER RESPONSE TEXT:    This patient has an Acute kidney injury.    Query created by: Lance Neri on 2024 4:06 PM      Electronically signed by:  Juan Christiansen DO 2024 6:58 AM          
4 Eyes Skin Assessment     NAME:  Alexys Zamora  YOB: 1935  MEDICAL RECORD NUMBER:  [id number]    The patient is being assessed for  Admission    I agree that at least one RN has performed a thorough Head to Toe Skin Assessment on the patient. ALL assessment sites listed below have been assessed.      Areas assessed by both nurses:    Sacrum. Buttock, Coccyx, Ischium        Does the Patient have a Wound? Yes wound(s) were present on assessment. LDA wound assessment was Initiated and completed by RN       Moy Prevention initiated by RN: Yes  Wound Care Orders initiated by RN: No    Pressure Injury (Stage 3,4, Unstageable, DTI, NWPT, and Complex wounds) if present, place Wound referral order by RN under : No    New Ostomies, if present place, Ostomy referral order under : No     Nurse 1 eSignature: Electronically signed by Kassie Burroughs RN on 2/16/24 at 1:25 AM EST    **SHARE this note so that the co-signing nurse can place an eSignature**    Nurse 2 eSignature: {Esignature:[id number]}   
ACUTE OCCUPATIONAL THERAPY GOALS:   (Developed with and agreed upon by patient and/or caregiver.)  1. Patient will perform grooming with min assist.   2. Patient will perform upper body dressing with mod assist.   4. Patient will perform upper body bathing with mod assist.  5. Patient will perform toilet transfer with CGA  6. Patient will perform ADL functional mobility and tranfers in room with CGA    Goals to be achieved in 7 days.     OCCUPATIONAL THERAPY Daily Note and AM       OT Visit Days: 2  Acknowledge Orders  Time  OT Charge Capture  Rehab Caseload Tracker      Alexys Zamora is a 88 y.o. female   PRIMARY DIAGNOSIS: Subdural hematoma (HCC)  Subdural hematoma (HCC) [S06.5XAA]  MARY (acute kidney injury) (HCC) [N17.9]  Urinary tract infection without hematuria, site unspecified [N39.0]  Altered mental status, unspecified altered mental status type [R41.82]  Moderate dementia with agitation, unspecified dementia type (HCC) [F03.B11]       Reason for Referral: Generalized Muscle Weakness (M62.81)  Other lack of cordination (R27.8)  Difficulty in walking, Not elsewhere classified (R26.2)  Inpatient: Payor: MEDICARE / Plan: MEDICARE PART A AND B / Product Type: *No Product type* /     ASSESSMENT:     REHAB RECOMMENDATIONS:   Recommendation to date pending progress:  Setting:  Back to Memory care  with more assistance until she is at her baseline    Equipment:    To Be Determined     ASSESSMENT:  Ms. Zamora admitted with above diagnosis. Pt with history of dementia and lives at memory care facility. Pt agreeable to getting up with therapy. Pt pleasantly confused. Pt is performing below her baseline and would benefit from skilled OT to address the following deficits: decreased self care, functional mobility, strength and activity tolerance. This pm, pt completed bed mobility and functional mobility in room. Pt returned to bed and supine. Pt left with bed alarm intact, needs in reach and RN notified. Will follow 
ACUTE OCCUPATIONAL THERAPY GOALS:   (Developed with and agreed upon by patient and/or caregiver.)  1. Patient will perform grooming with min assist.   2. Patient will perform upper body dressing with mod assist.   4. Patient will perform upper body bathing with mod assist.  5. Patient will perform toilet transfer with CGA  6. Patient will perform ADL functional mobility and tranfers in room with CGA    Goals to be achieved in 7 days.     OCCUPATIONAL THERAPY Initial Assessment and PM       OT Visit Days: 1  Acknowledge Orders  Time  OT Charge Capture  Rehab Caseload Tracker      Alexys Zamora is a 88 y.o. female   PRIMARY DIAGNOSIS: Subdural hematoma (HCC)  Subdural hematoma (HCC) [S06.5XAA]  MARY (acute kidney injury) (HCC) [N17.9]  Urinary tract infection without hematuria, site unspecified [N39.0]  Altered mental status, unspecified altered mental status type [R41.82]  Moderate dementia with agitation, unspecified dementia type (HCC) [F03.B11]       Reason for Referral: Generalized Muscle Weakness (M62.81)  Other lack of cordination (R27.8)  Difficulty in walking, Not elsewhere classified (R26.2)  Inpatient: Payor: MEDICARE / Plan: MEDICARE PART A AND B / Product Type: *No Product type* /     ASSESSMENT:     REHAB RECOMMENDATIONS:   Recommendation to date pending progress:  Setting:  Back to Memory care  with more assistance until she is at her baseline    Equipment:    To Be Determined     ASSESSMENT:  Ms. Zamora admitted with above diagnosis. Pt with history of dementia and lives at memory care facility. Pt agreeable to getting up with therapy. Pt pleasantly confused. Pt is performing below her baseline and would benefit from skilled OT to address the following deficits: decreased self care, functional mobility, strength and activity tolerance. This pm, pt completed bed mobility and functional mobility in room. Pt returned to bed and supine. Pt left with bed alarm intact, needs in reach and RN notified. Will 
ACUTE PHYSICAL THERAPY GOALS:   (Developed with and agreed upon by patient and/or caregiver.)  DISCHARGE GOALS ;  (1.)Ms. Zamora will move from supine to sit and sit to supine  with MINIMAL ASSIST.  (2.)Ms. Zamora will transfer from bed to chair and chair to bed with CONTACT GUARD ASSIST using a walker.  (3.)Ms. Zamora will ambulate with CONTACT GUARD ASSIST for 40-50 feet with rolling walker.     .  ________________________________________________________________________________________________     PHYSICAL THERAPY Daily Note and AM  (Link to Caseload Tracking: PT Visit Days : 3  Acknowledge Orders  Time In/Out  PT Charge Capture  Rehab Caseload Tracker    Alexys Zamora is a 88 y.o. female   PRIMARY DIAGNOSIS: Subdural hematoma (HCC)  Subdural hematoma (HCC) [S06.5XAA]  MARY (acute kidney injury) (HCC) [N17.9]  Urinary tract infection without hematuria, site unspecified [N39.0]  Altered mental status, unspecified altered mental status type [R41.82]  Moderate dementia with agitation, unspecified dementia type (HCC) [F03.B11]       Reason for Referral: Generalized Muscle Weakness (M62.81)  Other lack of cordination (R27.8)  Difficulty in walking, Not elsewhere classified (R26.2)  Other abnormalities of gait and mobility (R26.89)  Inpatient: Payor: MEDICARE / Plan: MEDICARE PART A AND B / Product Type: *No Product type* /     ASSESSMENT:     REHAB RECOMMENDATIONS:   Recommendation to date pending progress:  Setting:  Memory care with more physical assist needed until pt returns back to baseline    Equipment:    To Be Determined     ASSESSMENT:  Ms. Zamora presented as lethargic, not oriented x 4, but verbally responsive, eyes intermittently open with stimulation & following cues but inconsistently. This pt was in a memory care facility prior to this admission but currently she needs max assist for bed mobility & minimal assist to transfer ambulate short distances using a RW.  Pt's facility will need to provide the 
ACUTE PHYSICAL THERAPY GOALS:   (Developed with and agreed upon by patient and/or caregiver.)  DISCHARGE GOALS ;  (1.)Ms. Zamora will move from supine to sit and sit to supine  with MINIMAL ASSIST.  (2.)Ms. Zamora will transfer from bed to chair and chair to bed with CONTACT GUARD ASSIST using a walker.  (3.)Ms. Zamora will ambulate with CONTACT GUARD ASSIST for 40-50 feet with rolling walker.     .  ________________________________________________________________________________________________     PHYSICAL THERAPY Daily Note and AM  (Link to Caseload Tracking: PT Visit Days : 4  Acknowledge Orders  Time In/Out  PT Charge Capture  Rehab Caseload Tracker    Alexys Zamora is a 88 y.o. female   PRIMARY DIAGNOSIS: Subdural hematoma (HCC)  Subdural hematoma (HCC) [S06.5XAA]  MARY (acute kidney injury) (HCC) [N17.9]  Urinary tract infection without hematuria, site unspecified [N39.0]  Altered mental status, unspecified altered mental status type [R41.82]  Moderate dementia with agitation, unspecified dementia type (HCC) [F03.B11]       Reason for Referral: Generalized Muscle Weakness (M62.81)  Other lack of cordination (R27.8)  Difficulty in walking, Not elsewhere classified (R26.2)  Other abnormalities of gait and mobility (R26.89)  Inpatient: Payor: MEDICARE / Plan: MEDICARE PART A AND B / Product Type: *No Product type* /     ASSESSMENT:     REHAB RECOMMENDATIONS:   Recommendation to date pending progress:  Setting:  Memory care with more physical assist needed until pt returns back to baseline    Equipment:    To Be Determined     ASSESSMENT:  Ms. Zamora presented as lethargic, not oriented x 4, but verbally responsive, eyes intermittently open with stimulation & following cues but inconsistently. This pt was in a memory care facility prior to this admission but currently she needs max assist for bed mobility & minimal assist to transfer ambulate short distances using a RW.  Pt's facility will need to provide the 
ACUTE PHYSICAL THERAPY GOALS:   (Developed with and agreed upon by patient and/or caregiver.)  DISCHARGE GOALS ;  (1.)Ms. Zamora will move from supine to sit and sit to supine  with MINIMAL ASSIST.  (2.)Ms. Zamora will transfer from bed to chair and chair to bed with CONTACT GUARD ASSIST using a walker.  (3.)Ms. Zamora will ambulate with CONTACT GUARD ASSIST for 40-50 feet with rolling walker.     .  ________________________________________________________________________________________________     PHYSICAL THERAPY Daily Note and AM; RE-EVALUATION  (Link to Caseload Tracking: PT Visit Days : 1  Acknowledge Orders  Time In/Out  PT Charge Capture  Rehab Caseload Tracker    Alexys Zamora is a 88 y.o. female   PRIMARY DIAGNOSIS: Subdural hematoma (HCC)  Subdural hematoma (HCC) [S06.5XAA]  MARY (acute kidney injury) (HCC) [N17.9]  Urinary tract infection without hematuria, site unspecified [N39.0]  Altered mental status, unspecified altered mental status type [R41.82]  Moderate dementia with agitation, unspecified dementia type (HCC) [F03.B11]       Reason for Referral: Generalized Muscle Weakness (M62.81)  Other lack of cordination (R27.8)  Difficulty in walking, Not elsewhere classified (R26.2)  Other abnormalities of gait and mobility (R26.89)  Inpatient: Payor: MEDICARE / Plan: MEDICARE PART A AND B / Product Type: *No Product type* /     ASSESSMENT:     REHAB RECOMMENDATIONS:   Recommendation to date pending progress:  Setting:  D/C to correction/Memory Care    Equipment:    To Be Determined     ASSESSMENT:  Ms. Zamora participated well today.  She was pleasantly confused this am and easily agreeable to working with therapy.  She has not ambulated much during this admission but was able to ambulate increased distance today.  She needed increased assist initially upon standing due to posterior lean but this improved once she mobilized.  She also needed increased assist for direction/negotiating the ROLLING WALKER.  Patient 
ACUTE PHYSICAL THERAPY GOALS:   (Developed with and agreed upon by patient and/or caregiver.)  DISCHARGE GOALS ;  (1.)Ms. Zamora will move from supine to sit and sit to supine  with MINIMAL ASSIST.  (2.)Ms. Zamora will transfer from bed to chair and chair to bed with CONTACT GUARD ASSIST using a walker.  (3.)Ms. Zamora will ambulate with CONTACT GUARD ASSIST for 40-50 feet with rolling walker.     .  ________________________________________________________________________________________________     PHYSICAL THERAPY Daily Note and PM  (Link to Caseload Tracking: PT Visit Days : 2  Acknowledge Orders  Time In/Out  PT Charge Capture  Rehab Caseload Tracker    Alexys Zamora is a 88 y.o. female   PRIMARY DIAGNOSIS: Subdural hematoma (HCC)  Subdural hematoma (HCC) [S06.5XAA]  MARY (acute kidney injury) (HCC) [N17.9]  Urinary tract infection without hematuria, site unspecified [N39.0]  Altered mental status, unspecified altered mental status type [R41.82]  Moderate dementia with agitation, unspecified dementia type (HCC) [F03.B11]       Reason for Referral: Generalized Muscle Weakness (M62.81)  Other lack of cordination (R27.8)  Difficulty in walking, Not elsewhere classified (R26.2)  Other abnormalities of gait and mobility (R26.89)  Inpatient: Payor: MEDICARE / Plan: MEDICARE PART A AND B / Product Type: *No Product type* /     ASSESSMENT:     REHAB RECOMMENDATIONS:   Recommendation to date pending progress:  Setting:  Memory care with more physical assist needed until pt returns back to baseline    Equipment:    To Be Determined     ASSESSMENT:  Ms. Zamora presented as lethargic, not oriented x 4, but verbally responsive, eyes intermittently open with stimulation & following cues but inconsistently. This pt was in a memory care facility prior to this admission but currently she needs max assist for bed mobility & minimal assist to transfer ambulate short distances using a RW.  Pt's facility will need to provide the 
Attempted to complete Handoff NIH, but patient is unwilling to participate.  She is picking at the covers on the bed, but refuses to state her  saying, \"You should know it if you want to know it.\"  Patient will not allow pupils to be assessed, but L periorbital is swollen.  She will not squeeze hands, or move limbs on request, but does move them independently.  Unable to obtain accurate NIH - see all notes on flowsheet.   
Attempted to feed patient breakfast and administer medications. Asked patient if it was okay to administer meds and patient stated yes, however upon admin of meds patient kept picking them out of her mouth and wiping them in the bed. Obtained new medications and crushed and administered in applesauce, patient then took meds with no resistance/spitting and even ate approx 25% of breakfast.   
Comprehensive Nutrition Assessment    Type and Reason for Visit: Initial  Best Practice Alert for BMI <18.5    Nutrition Recommendations/Plan:   Meals and Snacks:  Diet: Continue current order  Nutrition Supplement Therapy:  Medical food supplement therapy:  Initiate Ensure Enlive twice per day (this provides 350 kcal and 20 grams protein per bottle) and Magic Cup twice per day (this provides 290 kcal and 9 grams protein per serving)       Malnutrition Assessment:  Malnutrition Status: Insufficient data (+ weight loss if current weights are accurate, limited visual NFPE, no diet hx. Pt at nutrition risk at a minimum.)  NFPE:  Visually with mild thinness, no siomara fat or muscle wasting. Pt combative.       Nutrition Assessment:  Nutrition History: Unable to obtain        Weight History:   11/10/2023 110 lbs Bedside scale-hospital encounter   7/18/2023 106# PCP OV   1/30/2023 107# PCP OV   106# to 85#=19.8% if current weight is accurate  Nutrition Background:       PMH/PSH:dementia  Presented with increased combativeness/aggressive behavior x 10 days,also recent fall hitting her head  Findings of UTI, CT scan shows evidence of new subacute on chronic subdural hematoma overlying the cerebral convexities, no midline shift.     Nutrition Interval:  2/16: SLP eval-Regular diet   Pt seen sitting in bed in restraints. ALEX Duque present,aet pt up fo lunch and removed restraints. Attempted to feed pt but she refused. When RN and RD left room, pt then requested something to eat and started to drink milk on her own. Current RN has had pt for past 3 days and reports pt ate bites at breakfast, 50% of lunch and 75% of dinner. Pt does feed herself but when family is here she eats better and allows them to feed her. Pt continues to have periods of screaming and spitting, pinching and being combative with nurses.    Current Nutrition Therapies:  ADULT DIET; Easy to Chew    Current Intake:   Average Meal Intake: 26-50% (based on 
Comprehensive Nutrition Assessment    Type and Reason for Visit: Reassess  Best Practice Alert for BMI <18.5    Nutrition Recommendations/Plan:   Meals and Snacks:  Diet: Continue current order  Nutrition Supplement Therapy:  Medical food supplement therapy:  Continue Ensure Enlive twice per day (this provides 350 kcal and 20 grams protein per bottle) and Magic Cup twice per day (this provides 290 kcal and 9 grams protein per serving)       Malnutrition Assessment:  Malnutrition Status: Insufficient data (+ weight loss if current weights are accurate, limited visual NFPE, no diet hx. Pt at nutrition risk at a minimum.)  NFPE:  Visually with mild thinness, no siomara fat or muscle wasting.       Nutrition Assessment:  Nutrition History: 2/19: Unable to obtain        Weight History:   11/10/2023 110 lbs Bedside scale-hospital encounter   7/18/2023 106# PCP OV   1/30/2023 107# PCP OV   106# to 85#=19.8% if current weight is accurate  Nutrition Background:       PMH/PSH:dementia, HTN, HLD  Presented with increased combativeness/aggressive behavior x 10 days,also recent fall hitting her head  Admitted with acute metabolic encephalopathy  CT scan shows evidence of new subacute on chronic subdural hematoma overlying the cerebral convexities, no midline shift.     Nutrition Interval:  2/16: SLP eval-Regular diet   Pt seen sitting in bed with eyes closed. Dunia RN reports pt was awake all night and did not go to sleep until ~0600 this morning. Pt ate little breakfast but that seems to be the pattern for her. She did eat ~50% of lunch when same RN had this pt on 2/19. PCT reports pt drank <50% of chocolate Ensure Enlive this morning. RN notes indicate pt accepted medications in chocolate magic cup on 2/20 AM. PCT setting up pt for lunch now.   Current Nutrition Therapies:  ADULT DIET; Easy to Chew  ADULT ORAL NUTRITION SUPPLEMENT; Breakfast, Lunch; Standard High Calorie/High Protein Oral Supplement  ADULT ORAL NUTRITION 
Critical Care Interdisciplinary Rounds with staff.    Marie Sotelo M.Div.      
Daughter has left and patient became more agitated.  Pt screaming and trying to fight nursing staff at the desk.  Pt placed back in bed.  Bed alarm on.  Haldol administered IM.  
Daughter, kendrick, at bedside. Pt moved back to her room. Pt in chair with family present.  
NEUROSURGERY PLAN OF CARE NOTE:     Chart and Imaging Reviewed: Patient Discussed with Dr. Ludivina Zamora 88 y.o. female presented to [facility] from her living facility with known dementia, falls and recent progression of combativeness. I have independently reviewed and interpreted the CT Head WO contrast which demonstrates extensive cerebral volume loss (I.e.atrophy) with bilateral non-compressive subdural hematoma/hygromas with a trace amount of right frontal acute subdural hemorrhage. There is no significant mass effect, brain compression or mid-line shift. No acute neurosurgical intervention necessary at this time. In the absence of acute neurosurgical needs she may remains at [facility]. Recommend repeat CT Head WO contrast in the am. Recommend holding anti-coagulant medications. Recommend holding Keppra as the risk of seizure prophylaxis outweighs the benefits given her advanced age and dementia at baseline. Recommend discuss goals of care given she she is DNR to determine if family would ever want any lifesaving, non-morbidity changing, life-saving neurosurgical intervention if the need were to ever arise.Please call with questions or concerns. Full in person neurosurgical consultation services are not provided at [facility] at this time.    I spent a total of 5-10 minutes of medical consultative discussion and review.     Toni San MD, FAANS  Neurosurgeon  [facility]  [facility]       
Occupational Therapy Note:    Attempted to see patient this AM for occupational therapy evaluation session. RN (Darleen) noted pt had just worked with PT and was quietly resting in the chair and it would be better to give her some time. Attempted again late in the afternoon when time permitted, RN (Cameron) noted pt was back in bed after much effort spent on cleaning and attending to her medical needs. OT will attempt again tomorrow.      Thank you,    Fito Garcia, OT    Rehab Caseload Tracker     
Occupational/Physical Therapy Note:    Attempted to see patient this AM for occupational therapy evaluation session and physical therapy treatment session. Patient's RN reports she has been combative this morning, is in restraints, yet said therapy could attempt today. Patient refused to participate even with encouragement. Will follow and re-attempt as schedule permits/patient available. Thank you,    Enedina Dyer, OT    Rehab Caseload Tracker      
Patient agitated, trying to get out bed, kicking at staff.  MD notified with orders placed.   
Patient discharged to \"[facility]\". Discharge paperwork placed in packet along with prescriptions. Report called to ALEX Gaviria. Awaiting for tranpsort to arrive at 3522    
Patient has not voided this shift. Patient usually incontinent in brief, brief checked several times with no UOP noted. Patient bladder scanned at this time. Bladder scan amount 106ml. Dr Christiansen notified through perfect serve. Will cont to monitor.   
Patient remained in restraints all night as she was awake and continuously attempted to get out of bed even in bilateral wrist restraints.  Patient spent a majority of the night fidgeting with the bed linens while trying to go various places.  She was able to take Seroquel in a chocolate magic cup, but repeatedly spit out her Namenda and Lipitor.    
Patient repeatedly attempting to get OOB, unable to set bed alarm as patient slight of stature & 90 lbs. Bed alarming continuously. Patient placed on posy pad in bed. Patient becoming combative with RN, refusing to be re-directed or follow commands, attempting to bite and pinch this RN. When attempting to help patient back to bed,  she began screaming uncontrollably and again hitting, biting, and pinching staff. Patient assisted back to bed, with posy in place. PRN dose of zyprexa administered at this time. Patient pulling at lines and chords and continuing to fight to get OOB. Patient pulled out RFA IV, refusing to allow this RN to replace. Will ask again when patient more calm. Dr Fuentes notified via perfect serve.   
Patient slept through the night.  Did not administer any PO medications due to patient sleeping soundly and not being able to waken enough to safely take PO medications.   
Physical Therapy Note:    Attempted to see patient this PM for physical therapy treatment  session.   Patient has been screaming out.  Nurse gave her Haldol because she was getting  agitated . Will follow and re-attempt as schedule permits/patient available. Thank you,    LEONARDO XIONG, [facility]     Rehab Caseload Tracker   
Pt ambulated around unit with walker and nurse assist. Returned to bed. Bed alarm set.  
Pt becoming more agitated and attempting to climb out of the bed.  Pt assisted to the BSC.  Brief changed.  Pt helped to chair and set up at nursing station with coloring book, crayons and ensure.  
Pt has eaten % of breakfast, lunch, and dinner. Assisted needed setting up food, pt able to feed self.   
Pt sitting at nursing station with staff. Pt congruent, folding towels and looking at coloring book.   
TRANSFER - IN REPORT:    Verbal report received from ALEX Weeks on Alexys Zamora  being received from [facility] for routine progression of patient care      Report consisted of patient's Situation, Background, Assessment and   Recommendations(SBAR).     Information from the following report(s) Nurse Handoff Report was reviewed with the receiving nurse.    Opportunity for questions and clarification was provided.      Assessment completed upon patient's arrival to unit and care assumed.    
Therapy Note:    Attempted to see patient this AM for occupational therapy evaluation session. Patient is noted, upon entry to ICU, to be in mittens and combative with Rn. RN agreeable to hold therapy today including PT.  Will follow and re-attempt as schedule permits/patient available. Thank you,    Fito Garcia, OT    Rehab Caseload Tracker     
Therapy Note:    Attempted to see patient this AM for occupational therapy evaluation session. RN noted after agitation yesterday, pt was started on Seroquel and asked if PT/OT could try tomorrow. Will follow and re-attempt as schedule permits/patient available. Thank you,    Fito Garcia, OT    Rehab Caseload Tracker     
This RN entered room and awoke patient. Patient Alert to self only. Brief checked and changed. Patient repositioned in bed to assist with feeding breakfast and med admin. Upon attempting to administer meds, patient began screaming and spitting, pinching and being combative with nurses. Attempted several times to calm patient down, without success. Patient increasingly agitated and screaming if RN even attempted to talk to her or touch her. Meds discarded. Patient attempting to get OOB and fight with staff, bilateral wrist restraints replaced and DR Riggins notified, order received. Patient left alone to see if she would calm down. Will cont to monitor.   
NUTRITION SUPPLEMENT; Breakfast, Lunch; Standard High Calorie/High Protein Oral Supplement  ADULT ORAL NUTRITION SUPPLEMENT; Lunch, Dinner; Frozen Oral Supplement  VTE prophylaxis: Lovenox  Code status: DNR  Dispo: CM assisting with DC planning. Likely rehab vs long term care or memory unit.     Non-peripheral Lines and Tubes (if present):      none    Hospital Problems:  Principal Problem:    Subdural hematoma (HCC)  Active Problems:    Essential hypertension with goal blood pressure less than 140/90    Dementia with behavioral disturbance (HCC)  Resolved Problems:    * No resolved hospital problems. *      Objective:   Patient Vitals for the past 24 hrs:   Temp Pulse Resp BP SpO2   02/21/24 1153 -- -- -- (!) 144/84 98 %   02/21/24 0952 -- -- -- (!) 147/78 --   02/21/24 0800 98 °F (36.7 °C) 78 22 (!) 147/78 93 %   02/20/24 2108 -- -- -- (!) 154/62 --   02/20/24 1916 97.9 °F (36.6 °C) 66 16 (!) 158/62 98 %   02/20/24 1625 -- -- -- (!) 150/73 95 %       Physical Exam:   General: in no acute distress. Elderly   Head: no scleral icterus   CV: regular rate and rhythm   Lungs: clear breath sounds bilaterally   Abdomen: abdomen is soft, non-tender, non-distended   Extremities: no BLE edema   Skin: warm and dry    Neuro: no focal neurological deficits. Alert but disoriented   Psych: flat affect     I have personally reviewed recent labs and imaging.    Signed:  Juan Christiansen DO    
Sertraline    PT/OT evals and PPD needed/ordered?  Yes  Diet:  ADULT DIET; Easy to Chew  ADULT ORAL NUTRITION SUPPLEMENT; Breakfast, Lunch; Standard High Calorie/High Protein Oral Supplement  ADULT ORAL NUTRITION SUPPLEMENT; Lunch, Dinner; Frozen Oral Supplement  VTE prophylaxis: Lovenox  Code status: DNR  Dispo: Probable DC to [facility] tomorrow.     Non-peripheral Lines and Tubes (if present):      none    Hospital Problems:  Principal Problem:    Subdural hematoma (HCC)  Active Problems:    Essential hypertension with goal blood pressure less than 140/90    Dementia with behavioral disturbance (HCC)  Resolved Problems:    * No resolved hospital problems. *      Objective:   Patient Vitals for the past 24 hrs:   Temp Pulse Resp BP SpO2   02/25/24 0742 97.7 °F (36.5 °C) 66 18 (!) 162/68 100 %   02/24/24 2300 97.7 °F (36.5 °C) 67 16 (!) 93/53 100 %   02/24/24 1901 97.9 °F (36.6 °C) 83 18 (!) 107/57 97 %       Physical Exam:   General: in no acute distress. Elderly   Head: no scleral icterus   CV: regular rate and rhythm   Lungs: clear breath sounds bilaterally   Abdomen: abdomen is soft, non-tender, non-distended   Extremities: no BLE edema   Skin: warm and dry    Neuro: no focal neurological deficits. Alert but disoriented   Psych: normal affect     I have personally reviewed recent labs and imaging.    Signed:  Juan Christiansen DO  
      Oxygen Therapy  SpO2: 92 %  Pulse Oximetry Type: Intermittent  Pulse via Oximetry: 63 beats per minute  SPO2 High Alarm Limit: 100  SPO2 Low Alarm Limit POX: 90  Pulse Oximeter Device Mode: Intermittent  Pulse Oximeter Device Location: Right, Hand, Finger  O2 Device: None (Room air)  Oximetry Probe Site Changed: No  Skin Assessment: Clean, dry, & intact  Skin Protection for O2 Device: N/A  O2 Flow Rate (L/min): 0 L/min  Oxygen Therapy: None (Room air)    Estimated body mass index is 16.97 kg/m² as calculated from the following:    Height as of this encounter: 1.524 m (5').    Weight as of this encounter: 39.4 kg (86 lb 14.4 oz).  No intake or output data in the 24 hours ending 02/20/24 1234        Physical Exam:     General:    Elderly female, alert, awake, chronically ill-appearing, still confused, intermittently restless, in restraints,   Head:  Normocephalic, atraumatic  Eyes:  Sclerae appear normal.  Pupils equally round.  ENT:  Nares appear normal.  Moist oral mucosa  Neck:  No restricted ROM.  Trachea midline   CV:   RRR.  No m/r/g.  No jugular venous distension.  Lungs:   CTAB.  No wheezing, rhonchi, or rales.  Symmetric expansion.  Abdomen:   Soft, nontender, nondistended.  Extremities: No cyanosis or clubbing.  No edema  Skin:     No rashes.  Normal coloration.   Warm and dry.    Neuro:  CN II-XII grossly intact.  Alert awake oriented only to person but not place or time. Dementia.  Able to move all extremities.  Psych:  Flat mood and affect.      I have personally reviewed labs and tests:  Recent Labs:  Recent Results (from the past 48 hour(s))   CBC with Auto Differential    Collection Time: 02/19/24  2:57 AM   Result Value Ref Range    WBC 3.4 (L) 4.3 - 11.1 K/uL    RBC 4.12 4.05 - 5.2 M/uL    Hemoglobin 12.6 11.7 - 15.4 g/dL    Hematocrit 39.1 35.8 - 46.3 %    MCV 94.9 82.0 - 102.0 FL    MCH 30.6 26.1 - 32.9 PG    MCHC 32.2 31.4 - 35.0 g/dL    RDW 15.6 (H) 11.9 - 14.6 %    Platelets 230 150 - 450 
  Edema []    Activity Tolerance []  poor    []      COGNITION/  PERCEPTION: Intact Impaired (Comments):   Orientation []  Not oriented x 4   Vision []  Unable to assess   Hearing []   Unable to assess   Cognition  []  Follows cue inconsistently, no safety awareness, unable to care for self     MOBILITY: I Mod I S SBA CGA Min Mod Max Total  NT x2 Comments:   Bed Mobility    Rolling [] [] [] [] [] [] [] [x] [] [] []    Supine to Sit [] [] [] [] [] [] [] [x] [] [] []    Scooting [] [] [] [] [] [] [] [x] [] [] []    Sit to Supine [] [] [] [] [] [] [] [] [] [] []    Transfers    Sit to Stand [] [] [] [] [] [x] [] [] [] [] []    Bed to Chair [] [] [] [] [] [x] [] [] [] [] []    Stand to Sit [] [] [] [] [] [x] [] [] [] [] []     [] [] [] [] [] [] [] [] [] [] []    I=Independent, Mod I=Modified Independent, S=Supervision, SBA=Standby Assistance, CGA=Contact Guard Assistance,   Min=Minimal Assistance, Mod=Moderate Assistance, Max=Maximal Assistance, Total=Total Assistance, NT=Not Tested    GAIT: I Mod I S SBA CGA Min Mod Max Total  NT x2 Comments:   Level of Assistance [] [] [] [] [] [x] [] [] [] [] []    Distance 16 feet    DME Rolling Walker    Gait Quality Decreased shannan , Decreased step clearance, Decreased step length, Path deviations , and Trunk sway increased    Weightbearing Status Restrictions/Precautions  Restrictions/Precautions: Fall Risk, Bed Alarm    Stairs N/A     I=Independent, Mod I=Modified Independent, S=Supervision, SBA=Standby Assistance, CGA=Contact Guard Assistance,   Min=Minimal Assistance, Mod=Moderate Assistance, Max=Maximal Assistance, Total=Total Assistance, NT=Not Tested    PLAN:   FREQUENCY AND DURATION: Daily for duration of hospital stay or until stated goals are met, whichever comes first.    THERAPY PROGNOSIS: Fair    PROBLEM LIST:   (Skilled intervention is medically necessary to address:)  Decreased ADL/Functional Activities  Decreased Activity Tolerance  Decreased AROM/PROM  Decreased 
(Room air)    Estimated body mass index is 16.64 kg/m² as calculated from the following:    Height as of this encounter: 1.524 m (5').    Weight as of this encounter: 38.6 kg (85 lb 3.2 oz).    Intake/Output Summary (Last 24 hours) at 2/19/2024 1215  Last data filed at 2/18/2024 1940  Gross per 24 hour   Intake 160 ml   Output --   Net 160 ml         Physical Exam:     General:    Elderly female, alert, awake, chronically ill-appearing, still confused, intermittently restless, in restraints,   Head:  Normocephalic, atraumatic  Eyes:  Sclerae appear normal.  Pupils equally round.  ENT:  Nares appear normal.  Moist oral mucosa  Neck:  No restricted ROM.  Trachea midline   CV:   RRR.  No m/r/g.  No jugular venous distension.  Lungs:   CTAB.  No wheezing, rhonchi, or rales.  Symmetric expansion.  Abdomen:   Soft, nontender, nondistended.  Extremities: No cyanosis or clubbing.  No edema  Skin:     No rashes.  Normal coloration.   Warm and dry.    Neuro:  CN II-XII grossly intact.  Alert awake oriented only to person but not place or time. Dementia.  Able to move all extremities.  Psych:  Flat mood and affect.      I have personally reviewed labs and tests:  Recent Labs:  Recent Results (from the past 48 hour(s))   PLEASE READ & DOCUMENT PPD TEST IN 24 HRS    Collection Time: 02/17/24 12:26 PM   Result Value Ref Range    PPD, (POC) Negative     mm Induration 0 0 - 5 mm   PLEASE READ & DOCUMENT PPD TEST IN 48 HRS    Collection Time: 02/18/24 11:30 AM   Result Value Ref Range    PPD, (POC) Negative     mm Induration 0 0 - 5 mm   CBC with Auto Differential    Collection Time: 02/19/24  2:57 AM   Result Value Ref Range    WBC 3.4 (L) 4.3 - 11.1 K/uL    RBC 4.12 4.05 - 5.2 M/uL    Hemoglobin 12.6 11.7 - 15.4 g/dL    Hematocrit 39.1 35.8 - 46.3 %    MCV 94.9 82.0 - 102.0 FL    MCH 30.6 26.1 - 32.9 PG    MCHC 32.2 31.4 - 35.0 g/dL    RDW 15.6 (H) 11.9 - 14.6 %    Platelets 230 150 - 450 K/uL    MPV 9.6 9.4 - 12.3 FL    nRBC 0.00 
Decreased but functional   PROM []    Strength []  Decreased but functional   Balance [] Sitting - Static: Good  Sitting - Dynamic: Fair, +  Standing - Static: Fair  Standing - Dynamic: Fair (with RW)   Posture [] N/A   Sensation []  grossly   Coordination []  Decreased but functional   Tone []     Edema []    Activity Tolerance [] Treatment limited secondary to decreased cognition    []      COGNITION/  PERCEPTION: Intact Impaired (Comments):   Orientation [] Oriented to person, Disoriented to place, Disoriented to time, Disoriented to situation   Vision [x]  grossly   Hearing [x]      Cognition  [] Overall Cognitive Status: Exceptions  Arousal/Alertness: Appropriate responses to stimuli  Following Commands: Follows one step commands with increased time  Memory: Decreased recall of recent events, Decreased short term memory  Problem Solving: Assistance required to generate solutions, Assistance required to implement solutions     MOBILITY: I Mod I S SBA CGA Min Mod Max Total  NT x2 Comments:   Bed Mobility    Rolling [] [] [] [] [] [] [] [] [] [] []    Supine to Sit [] [] [] [] [] [] [] [] [] [] []    Scooting [] [] [] [] [] [] [] [] [] [] []    Sit to Supine [] [] [] [] [] [] [] [] [] [] []    Transfers    Sit to Stand [] [] [] [] [] [] [x] [] [] [] []    Bed to Chair [] [] [] [] [] [x] [x] [] [] [] []    Stand to Sit [] [] [] [] [] [x] [] [] [] [] []     [] [] [] [] [] [] [] [] [] [] []    I=Independent, Mod I=Modified Independent, S=Supervision, SBA=Standby Assistance, CGA=Contact Guard Assistance,   Min=Minimal Assistance, Mod=Moderate Assistance, Max=Maximal Assistance, Total=Total Assistance, NT=Not Tested    GAIT: I Mod I S SBA CGA Min Mod Max Total  NT x2 Comments:   Level of Assistance [] [] [] [] [] [x] [x] [] [] [] [] Increased assist for negotiating RW   Distance 40 feet    DME Rolling Walker    Gait Quality Decreased shannan , Decreased step clearance, Decreased step length, and Narrow base of support  
Device Location: Right, Hand, Finger  O2 Device: None (Room air)  Oximetry Probe Site Changed: No  Skin Assessment: Clean, dry, & intact  Skin Protection for O2 Device: N/A  O2 Flow Rate (L/min): 0 L/min  Oxygen Therapy: None (Room air)    Estimated body mass index is 17.73 kg/m² as calculated from the following:    Height as of this encounter: 1.524 m (5').    Weight as of this encounter: 41.2 kg (90 lb 12.8 oz).    Intake/Output Summary (Last 24 hours) at 2/18/2024 1123  Last data filed at 2/17/2024 2313  Gross per 24 hour   Intake 45 ml   Output 0 ml   Net 45 ml         Physical Exam:     General:    Elderly female, alert, awake, chronically ill-appearing, still confused, intermittently restless, in restraints,   Head:  Normocephalic, atraumatic  Eyes:  Sclerae appear normal.  Pupils equally round.  ENT:  Nares appear normal.  Moist oral mucosa  Neck:  No restricted ROM.  Trachea midline   CV:   RRR.  No m/r/g.  No jugular venous distension.  Lungs:   CTAB.  No wheezing, rhonchi, or rales.  Symmetric expansion.  Abdomen:   Soft, nontender, nondistended.  Extremities: No cyanosis or clubbing.  No edema  Skin:     No rashes.  Normal coloration.   Warm and dry.    Neuro:  CN II-XII grossly intact.  Alert awake oriented only to person but not place or time. Dementia.  Able to move all extremities.  Psych:  Flat mood and affect.      I have personally reviewed labs and tests:  Recent Labs:  Recent Results (from the past 48 hour(s))   CBC with Auto Differential    Collection Time: 02/17/24  3:20 AM   Result Value Ref Range    WBC 3.3 (L) 4.3 - 11.1 K/uL    RBC 3.83 (L) 4.05 - 5.2 M/uL    Hemoglobin 11.5 (L) 11.7 - 15.4 g/dL    Hematocrit 36.3 35.8 - 46.3 %    MCV 94.8 82.0 - 102.0 FL    MCH 30.0 26.1 - 32.9 PG    MCHC 31.7 31.4 - 35.0 g/dL    RDW 16.0 (H) 11.9 - 14.6 %    Platelets 214 150 - 450 K/uL    MPV 9.6 9.4 - 12.3 FL    nRBC 0.00 0.0 - 0.2 K/uL    Differential Type AUTOMATED      Neutrophils % 49 43 - 78 %    
Fair    PROBLEM LIST:   (Skilled intervention is medically necessary to address:)  Decreased ADL/Functional Activities  Decreased Activity Tolerance  Decreased AROM/PROM  Decreased Balance  Decreased Cognition  Decreased Coordination  Decreased Gait Ability  Decreased Safety Awareness  Decreased Strength  Decreased Transfer Abilities INTERVENTIONS PLANNED:   (Benefits and precautions of physical therapy have been discussed with the patient.)  Therapeutic Activity  Therapeutic Exercise/HEP  Gait Training  Education       TREATMENT:       TREATMENT:   Therapeutic Activity (15 Minutes): Therapeutic activity included Transfer Training, Ambulation on level ground, Sitting balance , and Standing balance to improve functional Activity tolerance, Balance, Coordination, Mobility, and Strength.     TREATMENT GRID:  B LE/UE Date:  2/20 Date:  2/21   Date:  2/22     Activity/Exercise Parameters Parameters Parameters   Ankle pumps  10 aa 10 aa 10   LAQ 10 aa   SAQ 10   Marching in place 10 aa     Hip abd/ad  10 aa 10   Heel slides  10 aa 10   Shoulder ab/ad/flex/ext  10 aa each    Elbow flex/ext  10 aa    SLR  10 AA 10     AFTER TREATMENT PRECAUTIONS: Bed/Chair Locked, Call light within reach, Chair, Needs within reach, and patient in recliner at nurses desk    INTERDISCIPLINARY COLLABORATION:  RN/ PCT    EDUCATION: Education Given To: Patient  Education Provided: Role of Therapy;Plan of Care;Transfer Training  Education Method: Verbal  Education Outcome: Continued education needed    TIME IN/OUT:  Time In: 1035  Time Out: 1050  Minutes: 15    GYPSY WADE PT   
Potassium 3.4 (L) 3.5 - 5.1 mmol/L    Chloride 111 103 - 113 mmol/L    CO2 26 21 - 32 mmol/L    Anion Gap 6 2 - 11 mmol/L    Glucose 85 65 - 100 mg/dL    BUN 14 8 - 23 MG/DL    Creatinine 0.69 0.6 - 1.0 MG/DL    Est, Glom Filt Rate >60 >60 ml/min/1.73m2    Calcium 8.7 8.3 - 10.4 MG/DL   CBC    Collection Time: 02/16/24  7:13 AM   Result Value Ref Range    WBC 3.3 (L) 4.3 - 11.1 K/uL    RBC 3.82 (L) 4.05 - 5.2 M/uL    Hemoglobin 11.5 (L) 11.7 - 15.4 g/dL    Hematocrit 36.5 35.8 - 46.3 %    MCV 95.5 82.0 - 102.0 FL    MCH 30.1 26.1 - 32.9 PG    MCHC 31.5 31.4 - 35.0 g/dL    RDW 15.9 (H) 11.9 - 14.6 %    Platelets 203 150 - 450 K/uL    MPV 9.7 9.4 - 12.3 FL    nRBC 0.00 0.0 - 0.2 K/uL   Hemoglobin A1c    Collection Time: 02/16/24  7:13 AM   Result Value Ref Range    Hemoglobin A1C 5.1 4.8 - 5.6 %    Estimated Avg Glucose 100 mg/dL   Lipid Panel    Collection Time: 02/16/24  7:13 AM   Result Value Ref Range    Cholesterol, Total 268 (H) <200 MG/DL    Triglycerides 150 35 - 150 MG/DL    HDL 72 (H) 40 - 60 MG/DL    LDL Calculated 166 (H) <100 MG/DL    VLDL Cholesterol Calculated 30 (H) 6.0 - 23.0 MG/DL    Chol/HDL Ratio 3.7         Current Meds:  Current Facility-Administered Medications   Medication Dose Route Frequency    tuberculin injection 5 Units  5 Units IntraDERmal Once    sodium chloride flush 0.9 % injection 5-40 mL  5-40 mL IntraVENous 2 times per day    sodium chloride flush 0.9 % injection 5-40 mL  5-40 mL IntraVENous PRN    0.9 % sodium chloride infusion   IntraVENous PRN    acetaminophen (TYLENOL) tablet 650 mg  650 mg Oral Q4H PRN    ondansetron (ZOFRAN-ODT) disintegrating tablet 4 mg  4 mg Oral Q8H PRN    Or    ondansetron (ZOFRAN) injection 4 mg  4 mg IntraVENous Q6H PRN    cefTRIAXone (ROCEPHIN) 1,000 mg in sterile water 10 mL IV syringe  1,000 mg IntraVENous Q24H    amLODIPine (NORVASC) tablet 10 mg  10 mg Oral Daily    memantine (NAMENDA) tablet 5 mg  5 mg Oral BID    sertraline (ZOLOFT) tablet 50 mg

## 2024-02-26 NOTE — FLOWSHEET NOTE
02/26/24 0655   Handoff   Communication Given Shift Handoff   Handoff Given To ALEX Duque   Handoff Received From ALEX Fernando   Handoff Communication Face to Face;At bedside   Time Handoff Given 0655   End of Shift Check Performed Yes

## 2024-02-26 NOTE — INTERDISCIPLINARY ROUNDS
Interdisciplinary team rounds were held 2/26/2024 with the following team members:Nursing, Pastoral Care, Physical Therapy, Physician, and Clinical Coordinator and the patient.    Plan of care discussed. See clinical pathway and/or care plan for interventions and desired outcomes.

## 2024-04-23 ENCOUNTER — APPOINTMENT (OUTPATIENT)
Dept: GENERAL RADIOLOGY | Age: 89
End: 2024-04-23
Payer: MEDICARE

## 2024-04-23 ENCOUNTER — HOSPITAL ENCOUNTER (INPATIENT)
Age: 89
LOS: 3 days | Discharge: HOSPICE | End: 2024-04-26
Attending: EMERGENCY MEDICINE | Admitting: STUDENT IN AN ORGANIZED HEALTH CARE EDUCATION/TRAINING PROGRAM
Payer: MEDICARE

## 2024-04-23 ENCOUNTER — APPOINTMENT (OUTPATIENT)
Dept: CT IMAGING | Age: 89
End: 2024-04-23
Payer: MEDICARE

## 2024-04-23 DIAGNOSIS — G93.41 ACUTE METABOLIC ENCEPHALOPATHY: ICD-10-CM

## 2024-04-23 DIAGNOSIS — J18.9 SEPSIS DUE TO PNEUMONIA (HCC): Primary | ICD-10-CM

## 2024-04-23 DIAGNOSIS — J96.01 ACUTE RESPIRATORY FAILURE WITH HYPOXIA (HCC): ICD-10-CM

## 2024-04-23 DIAGNOSIS — A41.9 SEPSIS DUE TO PNEUMONIA (HCC): Primary | ICD-10-CM

## 2024-04-23 DIAGNOSIS — E86.0 DEHYDRATION: ICD-10-CM

## 2024-04-23 DIAGNOSIS — E87.20 METABOLIC ACIDOSIS: ICD-10-CM

## 2024-04-23 PROBLEM — J96.90 RESPIRATORY FAILURE (HCC): Status: ACTIVE | Noted: 2024-04-23

## 2024-04-23 PROBLEM — J90 PLEURAL EFFUSION ON LEFT: Status: ACTIVE | Noted: 2024-04-23

## 2024-04-23 LAB
ALBUMIN SERPL-MCNC: 2.3 G/DL (ref 3.2–4.6)
ALBUMIN/GLOB SERPL: 0.5 (ref 1–1.9)
ALP SERPL-CCNC: 223 U/L (ref 35–104)
ALT SERPL-CCNC: 11 U/L (ref 12–65)
AMMONIA PLAS-SCNC: 13 UMOL/L (ref 11–51)
ANION GAP SERPL CALC-SCNC: 22 MMOL/L (ref 9–18)
APPEARANCE UR: ABNORMAL
ARTERIAL PATENCY WRIST A: POSITIVE
AST SERPL-CCNC: 30 U/L (ref 15–37)
BACTERIA URNS QL MICRO: 0 /HPF
BASE DEFICIT BLD-SCNC: 9.1 MMOL/L
BASOPHILS # BLD: 0.1 K/UL (ref 0–0.2)
BASOPHILS NFR BLD: 0 % (ref 0–2)
BDY SITE: ABNORMAL
BILIRUB SERPL-MCNC: 0.8 MG/DL (ref 0–1.2)
BILIRUB UR QL: ABNORMAL
BUN SERPL-MCNC: 49 MG/DL (ref 8–23)
CALCIUM SERPL-MCNC: 9.1 MG/DL (ref 8.8–10.2)
CHLORIDE SERPL-SCNC: 105 MMOL/L (ref 98–107)
CO2 SERPL-SCNC: 17 MMOL/L (ref 20–28)
COLOR UR: ABNORMAL
CREAT SERPL-MCNC: 1.01 MG/DL (ref 0.6–1.1)
D DIMER PPP FEU-MCNC: 3.92 UG/ML(FEU)
DIFFERENTIAL METHOD BLD: ABNORMAL
EOSINOPHIL # BLD: 0 K/UL (ref 0–0.8)
EOSINOPHIL NFR BLD: 0 % (ref 0.5–7.8)
EPI CELLS #/AREA URNS HPF: ABNORMAL /HPF
ERYTHROCYTE [DISTWIDTH] IN BLOOD BY AUTOMATED COUNT: 14.5 % (ref 11.9–14.6)
GAS FLOW.O2 O2 DELIVERY SYS: ABNORMAL
GLOBULIN SER CALC-MCNC: 4.7 G/DL (ref 2.3–3.5)
GLUCOSE BLD STRIP.AUTO-MCNC: 120 MG/DL (ref 65–100)
GLUCOSE BLD STRIP.AUTO-MCNC: 96 MG/DL (ref 65–100)
GLUCOSE SERPL-MCNC: 97 MG/DL (ref 70–99)
GLUCOSE UR STRIP.AUTO-MCNC: NEGATIVE MG/DL
HCO3 BLD-SCNC: 14.2 MMOL/L (ref 22–26)
HCT VFR BLD AUTO: 31.8 % (ref 35.8–46.3)
HGB BLD-MCNC: 10.5 G/DL (ref 11.7–15.4)
HGB UR QL STRIP: NEGATIVE
IMM GRANULOCYTES # BLD AUTO: 0.2 K/UL (ref 0–0.5)
IMM GRANULOCYTES NFR BLD AUTO: 1 % (ref 0–5)
KETONES UR QL STRIP.AUTO: 15 MG/DL
LACTATE SERPL-SCNC: 1.3 MMOL/L (ref 0.5–2)
LEUKOCYTE ESTERASE UR QL STRIP.AUTO: NEGATIVE
LYMPHOCYTES # BLD: 0.6 K/UL (ref 0.5–4.6)
LYMPHOCYTES NFR BLD: 2 % (ref 13–44)
MCH RBC QN AUTO: 30.1 PG (ref 26.1–32.9)
MCHC RBC AUTO-ENTMCNC: 33 G/DL (ref 31.4–35)
MCV RBC AUTO: 91.1 FL (ref 82–102)
MONOCYTES # BLD: 0.5 K/UL (ref 0.1–1.3)
MONOCYTES NFR BLD: 2 % (ref 4–12)
MUCOUS THREADS URNS QL MICRO: ABNORMAL /LPF
NEUTS SEG # BLD: 24.4 K/UL (ref 1.7–8.2)
NEUTS SEG NFR BLD: 95 % (ref 43–78)
NITRITE UR QL STRIP.AUTO: NEGATIVE
NRBC # BLD: 0 K/UL (ref 0–0.2)
NT PRO BNP: 6033 PG/ML (ref 0–450)
PCO2 BLD: 23.4 MMHG (ref 35–45)
PH BLD: 7.39 (ref 7.35–7.45)
PH UR STRIP: 5.5 (ref 5–9)
PLATELET # BLD AUTO: 390 K/UL (ref 150–450)
PMV BLD AUTO: 9.2 FL (ref 9.4–12.3)
PO2 BLD: 57 MMHG (ref 75–100)
POTASSIUM SERPL-SCNC: 3.2 MMOL/L (ref 3.5–5.1)
PROCALCITONIN SERPL-MCNC: 4.43 NG/ML (ref 0–0.1)
PROT SERPL-MCNC: 7.1 G/DL (ref 6.3–8.2)
PROT UR STRIP-MCNC: 30 MG/DL
RBC # BLD AUTO: 3.49 M/UL (ref 4.05–5.2)
RBC #/AREA URNS HPF: ABNORMAL /HPF
SAO2 % BLD: 89.7 % (ref 95–98)
SERVICE CMNT-IMP: ABNORMAL
SERVICE CMNT-IMP: NORMAL
SODIUM SERPL-SCNC: 144 MMOL/L (ref 136–145)
SP GR UR REFRACTOMETRY: 1.03 (ref 1–1.02)
SPECIMEN TYPE: ABNORMAL
TROPONIN T SERPL HS-MCNC: 18 NG/L (ref 0–14)
TSH, 3RD GENERATION: 0.92 UIU/ML (ref 0.27–4.2)
UROBILINOGEN UR QL STRIP.AUTO: 1 EU/DL (ref 0.2–1)
WBC # BLD AUTO: 25.8 K/UL (ref 4.3–11.1)
WBC URNS QL MICRO: ABNORMAL /HPF

## 2024-04-23 PROCEDURE — 6360000002 HC RX W HCPCS: Performed by: STUDENT IN AN ORGANIZED HEALTH CARE EDUCATION/TRAINING PROGRAM

## 2024-04-23 PROCEDURE — 82140 ASSAY OF AMMONIA: CPT

## 2024-04-23 PROCEDURE — 2580000003 HC RX 258: Performed by: STUDENT IN AN ORGANIZED HEALTH CARE EDUCATION/TRAINING PROGRAM

## 2024-04-23 PROCEDURE — 87641 MR-STAPH DNA AMP PROBE: CPT

## 2024-04-23 PROCEDURE — 82607 VITAMIN B-12: CPT

## 2024-04-23 PROCEDURE — 2700000000 HC OXYGEN THERAPY PER DAY

## 2024-04-23 PROCEDURE — 6360000004 HC RX CONTRAST MEDICATION: Performed by: STUDENT IN AN ORGANIZED HEALTH CARE EDUCATION/TRAINING PROGRAM

## 2024-04-23 PROCEDURE — 2580000003 HC RX 258: Performed by: EMERGENCY MEDICINE

## 2024-04-23 PROCEDURE — 85379 FIBRIN DEGRADATION QUANT: CPT

## 2024-04-23 PROCEDURE — 87040 BLOOD CULTURE FOR BACTERIA: CPT

## 2024-04-23 PROCEDURE — 96375 TX/PRO/DX INJ NEW DRUG ADDON: CPT

## 2024-04-23 PROCEDURE — 36600 WITHDRAWAL OF ARTERIAL BLOOD: CPT

## 2024-04-23 PROCEDURE — 96361 HYDRATE IV INFUSION ADD-ON: CPT

## 2024-04-23 PROCEDURE — 99285 EMERGENCY DEPT VISIT HI MDM: CPT

## 2024-04-23 PROCEDURE — 84484 ASSAY OF TROPONIN QUANT: CPT

## 2024-04-23 PROCEDURE — 96365 THER/PROPH/DIAG IV INF INIT: CPT

## 2024-04-23 PROCEDURE — 83605 ASSAY OF LACTIC ACID: CPT

## 2024-04-23 PROCEDURE — 87205 SMEAR GRAM STAIN: CPT

## 2024-04-23 PROCEDURE — 81001 URINALYSIS AUTO W/SCOPE: CPT

## 2024-04-23 PROCEDURE — 84443 ASSAY THYROID STIM HORMONE: CPT

## 2024-04-23 PROCEDURE — 70450 CT HEAD/BRAIN W/O DYE: CPT

## 2024-04-23 PROCEDURE — 6370000000 HC RX 637 (ALT 250 FOR IP): Performed by: STUDENT IN AN ORGANIZED HEALTH CARE EDUCATION/TRAINING PROGRAM

## 2024-04-23 PROCEDURE — 82803 BLOOD GASES ANY COMBINATION: CPT

## 2024-04-23 PROCEDURE — 71045 X-RAY EXAM CHEST 1 VIEW: CPT

## 2024-04-23 PROCEDURE — 87077 CULTURE AEROBIC IDENTIFY: CPT

## 2024-04-23 PROCEDURE — 82962 GLUCOSE BLOOD TEST: CPT

## 2024-04-23 PROCEDURE — 80053 COMPREHEN METABOLIC PANEL: CPT

## 2024-04-23 PROCEDURE — 2000000000 HC ICU R&B

## 2024-04-23 PROCEDURE — 71260 CT THORAX DX C+: CPT

## 2024-04-23 PROCEDURE — 6360000002 HC RX W HCPCS: Performed by: EMERGENCY MEDICINE

## 2024-04-23 PROCEDURE — 94761 N-INVAS EAR/PLS OXIMETRY MLT: CPT

## 2024-04-23 PROCEDURE — 84145 PROCALCITONIN (PCT): CPT

## 2024-04-23 PROCEDURE — 87186 SC STD MICRODIL/AGAR DIL: CPT

## 2024-04-23 PROCEDURE — 83880 ASSAY OF NATRIURETIC PEPTIDE: CPT

## 2024-04-23 PROCEDURE — 85025 COMPLETE CBC W/AUTO DIFF WBC: CPT

## 2024-04-23 PROCEDURE — 0202U NFCT DS 22 TRGT SARS-COV-2: CPT

## 2024-04-23 PROCEDURE — 87154 CUL TYP ID BLD PTHGN 6+ TRGT: CPT

## 2024-04-23 RX ORDER — QUETIAPINE FUMARATE 25 MG/1
12.5 TABLET, FILM COATED ORAL DAILY
Status: DISCONTINUED | OUTPATIENT
Start: 2024-04-24 | End: 2024-04-25

## 2024-04-23 RX ORDER — ACETAMINOPHEN 650 MG/1
650 SUPPOSITORY RECTAL EVERY 4 HOURS PRN
Status: DISCONTINUED | OUTPATIENT
Start: 2024-04-23 | End: 2024-04-26 | Stop reason: HOSPADM

## 2024-04-23 RX ORDER — ACETAMINOPHEN 325 MG/1
650 TABLET ORAL EVERY 6 HOURS PRN
Status: DISCONTINUED | OUTPATIENT
Start: 2024-04-23 | End: 2024-04-23

## 2024-04-23 RX ORDER — SODIUM CHLORIDE 9 MG/ML
INJECTION, SOLUTION INTRAVENOUS PRN
Status: DISCONTINUED | OUTPATIENT
Start: 2024-04-23 | End: 2024-04-26 | Stop reason: HOSPADM

## 2024-04-23 RX ORDER — ONDANSETRON 4 MG/1
4 TABLET, ORALLY DISINTEGRATING ORAL EVERY 8 HOURS PRN
Status: DISCONTINUED | OUTPATIENT
Start: 2024-04-23 | End: 2024-04-26 | Stop reason: HOSPADM

## 2024-04-23 RX ORDER — POLYETHYLENE GLYCOL 3350 17 G/17G
17 POWDER, FOR SOLUTION ORAL DAILY PRN
Status: DISCONTINUED | OUTPATIENT
Start: 2024-04-23 | End: 2024-04-26 | Stop reason: HOSPADM

## 2024-04-23 RX ORDER — 0.9 % SODIUM CHLORIDE 0.9 %
1500 INTRAVENOUS SOLUTION INTRAVENOUS
Status: COMPLETED | OUTPATIENT
Start: 2024-04-23 | End: 2024-04-23

## 2024-04-23 RX ORDER — ONDANSETRON 2 MG/ML
4 INJECTION INTRAMUSCULAR; INTRAVENOUS EVERY 6 HOURS PRN
Status: DISCONTINUED | OUTPATIENT
Start: 2024-04-23 | End: 2024-04-26 | Stop reason: HOSPADM

## 2024-04-23 RX ORDER — ACETAMINOPHEN 650 MG/1
650 SUPPOSITORY RECTAL EVERY 6 HOURS PRN
Status: DISCONTINUED | OUTPATIENT
Start: 2024-04-23 | End: 2024-04-23

## 2024-04-23 RX ORDER — QUETIAPINE FUMARATE 25 MG/1
25 TABLET, FILM COATED ORAL NIGHTLY
Status: DISCONTINUED | OUTPATIENT
Start: 2024-04-23 | End: 2024-04-25

## 2024-04-23 RX ORDER — VITAMIN B COMPLEX
2000 TABLET ORAL DAILY
Status: DISCONTINUED | OUTPATIENT
Start: 2024-04-23 | End: 2024-04-25

## 2024-04-23 RX ORDER — SODIUM CHLORIDE 0.9 % (FLUSH) 0.9 %
5-40 SYRINGE (ML) INJECTION PRN
Status: DISCONTINUED | OUTPATIENT
Start: 2024-04-23 | End: 2024-04-26 | Stop reason: HOSPADM

## 2024-04-23 RX ORDER — AMLODIPINE BESYLATE 10 MG/1
10 TABLET ORAL DAILY
Status: DISCONTINUED | OUTPATIENT
Start: 2024-04-23 | End: 2024-04-25

## 2024-04-23 RX ORDER — FUROSEMIDE 10 MG/ML
40 INJECTION INTRAMUSCULAR; INTRAVENOUS 2 TIMES DAILY
Status: DISCONTINUED | OUTPATIENT
Start: 2024-04-23 | End: 2024-04-24

## 2024-04-23 RX ORDER — MEMANTINE HYDROCHLORIDE 5 MG/1
10 TABLET ORAL 2 TIMES DAILY
Status: DISCONTINUED | OUTPATIENT
Start: 2024-04-23 | End: 2024-04-25

## 2024-04-23 RX ORDER — 0.9 % SODIUM CHLORIDE 0.9 %
30 INTRAVENOUS SOLUTION INTRAVENOUS ONCE
Status: DISCONTINUED | OUTPATIENT
Start: 2024-04-23 | End: 2024-04-23

## 2024-04-23 RX ORDER — SODIUM CHLORIDE 0.9 % (FLUSH) 0.9 %
5-40 SYRINGE (ML) INJECTION EVERY 12 HOURS SCHEDULED
Status: DISCONTINUED | OUTPATIENT
Start: 2024-04-23 | End: 2024-04-26 | Stop reason: HOSPADM

## 2024-04-23 RX ORDER — ACETAMINOPHEN 500 MG
500 TABLET ORAL EVERY 8 HOURS PRN
Status: DISCONTINUED | OUTPATIENT
Start: 2024-04-23 | End: 2024-04-23 | Stop reason: SDUPTHER

## 2024-04-23 RX ORDER — ACETAMINOPHEN 325 MG/1
650 TABLET ORAL EVERY 6 HOURS PRN
Status: DISCONTINUED | OUTPATIENT
Start: 2024-04-23 | End: 2024-04-26 | Stop reason: HOSPADM

## 2024-04-23 RX ORDER — ATORVASTATIN CALCIUM 40 MG/1
40 TABLET, FILM COATED ORAL NIGHTLY
Status: DISCONTINUED | OUTPATIENT
Start: 2024-04-23 | End: 2024-04-25

## 2024-04-23 RX ADMIN — FUROSEMIDE 40 MG: 10 INJECTION, SOLUTION INTRAMUSCULAR; INTRAVENOUS at 16:36

## 2024-04-23 RX ADMIN — VANCOMYCIN HYDROCHLORIDE 1000 MG: 1 INJECTION, POWDER, LYOPHILIZED, FOR SOLUTION INTRAVENOUS at 14:54

## 2024-04-23 RX ADMIN — ACETAMINOPHEN 650 MG: 650 SUPPOSITORY RECTAL at 17:24

## 2024-04-23 RX ADMIN — WATER 2000 MG: 1 INJECTION INTRAMUSCULAR; INTRAVENOUS; SUBCUTANEOUS at 16:36

## 2024-04-23 RX ADMIN — SODIUM CHLORIDE 1500 ML: 9 INJECTION, SOLUTION INTRAVENOUS at 14:04

## 2024-04-23 RX ADMIN — IOPAMIDOL 100 ML: 755 INJECTION, SOLUTION INTRAVENOUS at 18:24

## 2024-04-23 RX ADMIN — SODIUM CHLORIDE, PRESERVATIVE FREE 10 ML: 5 INJECTION INTRAVENOUS at 20:00

## 2024-04-23 RX ADMIN — CEFTRIAXONE SODIUM 2000 MG: 2 INJECTION, POWDER, FOR SOLUTION INTRAMUSCULAR; INTRAVENOUS at 14:53

## 2024-04-23 ASSESSMENT — LIFESTYLE VARIABLES
HOW OFTEN DO YOU HAVE A DRINK CONTAINING ALCOHOL: NEVER
HOW MANY STANDARD DRINKS CONTAINING ALCOHOL DO YOU HAVE ON A TYPICAL DAY: PATIENT DOES NOT DRINK

## 2024-04-23 ASSESSMENT — PAIN - FUNCTIONAL ASSESSMENT: PAIN_FUNCTIONAL_ASSESSMENT: ADULT NONVERBAL PAIN SCALE (NPVS)

## 2024-04-23 NOTE — ED TRIAGE NOTES
Pt presents via EMS from Memory care at facility ([facility]).  Pt would not wake up for staff this AM.  BP and O2 were low at facility.  O2 difficult to read upon arrival.          Pt has hx of dementia unable to answer appropriately, which is baseline per report.  Pt has hx of Chronic UTI. Staff reports known SDH

## 2024-04-23 NOTE — ED NOTES
TRANSFER - OUT REPORT:    Verbal report given to ALEX Mendez on Alexys Zamora  being transferred to [facility], ICU for routine progression of patient care       Report consisted of patient's Situation, Background, Assessment and   Recommendations(SBAR).     Information from the following report(s) ED SBAR was reviewed with the receiving nurse.    [place] Fall Assessment:    Presents to emergency department  because of falls (Syncope, seizure, or loss of consciousness): No  Age > 70: Yes  Altered Mental Status, Intoxication with alcohol or substance confusion (Disorientation, impaired judgment, poor safety awaremess, or inability to follow instructions): Yes  Impaired Mobility: Ambulates or transfers with assistive devices or assistance; Unable to ambulate or transer.: Yes  Nursing Judgement: Yes          Lines:   Peripheral IV 04/23/24 Left Forearm (Active)   Site Assessment Clean, dry & intact 04/23/24 1319       Peripheral IV 04/23/24 Right Antecubital (Active)   Site Assessment Clean, dry & intact 04/23/24 1319   Line Status Blood return noted;Flushed 04/23/24 1319   Phlebitis Assessment No symptoms 04/23/24 1319   Infiltration Assessment 0 04/23/24 1319   Dressing Status New dressing applied 04/23/24 1319   Dressing Type Transparent 04/23/24 1319   Dressing Intervention New 04/23/24 1319        Opportunity for questions and clarification was provided.      Patient transported with:  Personal Belongings, Monitor, RN

## 2024-04-23 NOTE — PROGRESS NOTES
VANCO NOTE RENAL INSUFFICIENCY PATIENT   [facility]   Pharmacy Pharmacokinetic Monitoring Service - Vancomycin    Consulting Provider: Vaishnavi   Indication: HAP  Target Concentration: Random level ? 20 mg/L  Day of Therapy: 1  Additional Antimicrobials: Cefepime    Pertinent Laboratory Values:   Wt Readings from Last 1 Encounters:   04/23/24 35.4 kg (78 lb 1.6 oz)     Temp Readings from Last 1 Encounters:   04/23/24 98.9 °F (37.2 °C) (Oral)     Recent Labs     04/23/24  1320   BUN 49*   CREATININE 1.01   WBC 25.8*   PROCAL 4.43*   LACTSEPSIS 1.3       No results found for: \"VANCOTROUGH\", \"VANCORANDOM\"    MRSA Nasal Swab: not ordered. Order placed by pharmacy.    Assessment:  Date:  Dose/Freq Admin Times Level/Time:   4/23 1000mg x1 1454    4/24   Rd @ (04)                       Plan:  Concentration-guided dosing due to renal impairment  Start vancomycin dosing intermittently. Will schedule pending renal function changes.  Vancomycin concentrations will be ordered as clinically appropriate   Pharmacy will continue to monitor patient and adjust therapy as indicated    Thank you for the consult,  Tesfaye Garrison, PharmD, BCPS

## 2024-04-23 NOTE — ED PROVIDER NOTES
Emergency Department Provider Note                   PCP:                Heena Rasmussen MD               Age: 88 y.o.      Sex: female   Final diagnosis/impression:  1. Sepsis due to pneumonia (HCC)    2. Acute metabolic encephalopathy    3. Metabolic acidosis    4. Dehydration    5. Acute respiratory failure with hypoxia (HCC)       Disposition: Admit to Hospitalist    MDM/Clinical Course:  Patient seen by myself at the [facility] emergency department. Patient had signs symptoms and clinical history most consistent with hypoxia, metabolic encephalopathy. My independent analysis/interpretation of laboratory work-up here shows white blood cell count is 25.8, hemoglobin 10.5, but it is within normal limits, CMP shows metabolic acidosis with bicarb of 17, anion gap mildly elevated at 22, BUN elevated at 49, creatinine within normal limits at 1.0.  Suspect these findings consistent with significant dehydration.  Potassium noted to be borderline/minimally low at 3.2.  Procalcitonin noted to be significantly elevated at 4.43.  NT proBNP noted to be significantly elevated at 6033.  Troponin noted to be generally unremarkable at 18.  ABG performed shows pH of 7.39 with CO2 of 23, PaO2 noted to be low at 57 prompting my instruction and respiratory therapy to have patient started on Airvo.  Radiology shows extensive left lung consolidation and pleural effusion. While under my care, patient received greater than 30 cc/kg IV fluid bolus, 2 g IV Rocephin, IV vancomycin.  Considered but did not add atypical coverage giving unilateral appearance of chest consolidation.  In considertion of patient clinical presentation, laboratory/radiologic findings, diagnoses/conditions previously discussed and clinical course as previously discussed, I did feel it appropriate to admit the patient for further evaluation, observation and management.  I communicated with the hospitalist in detail about the patient and they agreed to  mouth every 8 hours as needed for Pain    AMLODIPINE (NORVASC) 10 MG TABLET    Take 1 tablet by mouth daily    ATORVASTATIN (LIPITOR) 40 MG TABLET    Take 1 tablet by mouth nightly    MEMANTINE (NAMENDA) 10 MG TABLET    Take 1 tablet by mouth 2 times daily    QUETIAPINE (SEROQUEL) 25 MG TABLET    Take 0.5 tablets by mouth daily    QUETIAPINE (SEROQUEL) 25 MG TABLET    Take 1 tablet by mouth nightly    SERTRALINE (ZOLOFT) 50 MG TABLET    Take 1 tablet by mouth daily    VITAMIN D (VITAMIN D3) 50 MCG (2000 UT) CAPS CAPSULE    Take 1 capsule by mouth daily      Allergies:   Allergies   Allergen Reactions    Codeine Nausea And Vomiting    Oxycodone Nausea And Vomiting     Patient was on an empty stomach when given this med       Physical Exam   Vitals signs reviewed.   Temp: 98.9 °F (37.2 °C) Temp Source: Oral Pulse: 71 Heart Rate Source: Monitor BP: 103/43 (!) MAP (Calculated): 63 Respirations: 20 O2 Device: Nasal cannula O2 Flow Rate (L/min): 4 L/min     Not alert or oriented, mildly obtunded, ill/guarded appearance  Eyes: Anicteric, conjunctiva pink, PERRLA, EOMI  ENT: No nasal discharge, no gross nasal congestion present  Pulmonary: There is tachypnea, there is mild increased work of breathing, reduced left sided lung sounds present, no crackles or wheezes heard   cardiovascular: Regular rate and rhythm, no rub or gallop appreciated on my exam  GI: Abdomen is soft, nontender, nondistended  Musculoskeletal: No obvious joint deformity or joint effusion, normal joint range of motion  Neuro: Cranial nerves II through VII grossly intact, strength and sensation is grossly intact in the upper and lower extremities bilaterally  Skin: Skin is warm and dry, no rash    Procedures  No results found for any visits on 04/23/24.   No orders to display                   Voice dictation software was used during the making of this note.  This software is not perfect and grammatical and other typographical errors may be present.  This

## 2024-04-23 NOTE — ACP (ADVANCE CARE PLANNING)
[facility] (formerly [facility]) Hospitalist Service  At the heart of better care     Advance Care Planning   Admit Date:  2024  1:11 PM   Name:  Alexys Zamora   Age:  88 y.o.  Sex:  female  :  1935   MRN:  [id number]   Room:  [place] [address]    Alexys Zamora has capacity to make her own decisions:   No    If pt unable to make decisions, POA/surrogate decision maker:  Daughter    Other people present:   Daughter    Patient / surrogate decision-maker directed code status:  DNR/DNI    Other ACP topics discussed, if applicable:   Discussed possibility of hospice or comfort measures.      Patient or surrogate consented to discussion of the current conditions, workup, management plans, prognosis, and the risk for further deterioration.  Time spent: 16  minutes in direct discussion.      Signed:  Irlanda Riggins MD

## 2024-04-23 NOTE — H&P
Hospitalist History and Physical   Admit Date:  2024  1:11 PM   Name:  Alexys Zamora   Age:  88 y.o.  Sex:  female  :  1935   MRN:  [id number]   Room:  [facility]/    Presenting/Chief Complaint: Fatigue     Reason(s) for Admission: No admission diagnoses are documented for this encounter.     History of Present Illness:   Alexys Zamora is a 88 y.o. female with medical history of dementia currently in memory care unit, wheelchair-bound.  Was brought to the ER with hypoxia.  It was associated with cough, congestion.  They were unable to wake her up in the morning.  Patient was not following commands and unable to provide history.  History was obtained from the daughter at the bedside.  She stated she got a call from the memory care unit stating that they are unable to pick her up.  Of note, patient was recently admitted in the hospital for subdural hematoma and she was declining since then.    ED course afebrile, /58, saturating 88% on room air.  Labs significant for leukocytosis of 25 K, K 3.2, CO2 17, BUN/creatinine 49/1.01, LA 1.3, blood glucose 97, anion gap 22, procalcitonin 4.3.  Urine analysis negative.  CTA chest showed bilateral subdural hematomas and right to left midline shift.  Chest x-ray showed left pleural effusion.    Assessment & Plan:   Sepsis  Leukocytosis of 25 K, procalcitonin 4.43, lactic acid 1.3 and hypoxic.  U/A negative  Follow-up with respiratory panel  chest x-ray showed left consolidation and pleural effusion  F/u CAT scan   Follow-up with blood cultures,  Started on cefepime and vancomycin      Acute hypoxic respiratory failure in the setting of pneumonia with pleural effusion  Patient is saturating well on 6 LNC  Saturating 88% on room air  F/u ABG   Wean oxygen as tolerated  Pulmonary was consulted, appreciate recommendations    Acute on chronic subdural hematoma   CT head showed bilateral subdural hematoma and right to left midline shift  -Neurosurgery consulted,       Blood, Urine Negative NEG      Urobilinogen, Urine 1.0 0.2 - 1.0 EU/dL    Nitrite, Urine Negative NEG      Leukocyte Esterase, Urine Negative NEG      WBC, UA 0-3 0 /hpf    RBC, UA 3-5 0 /hpf    Epithelial Cells UA 3-5 0 /hpf    BACTERIA, URINE 0 0 /hpf    Mucus, UA 1+ (H) 0 /lpf   POCT Glucose    Collection Time: 04/23/24  1:57 PM   Result Value Ref Range    POC Glucose 96 65 - 100 mg/dL    Performed by: Tyler    Arterial Blood Gas, POC    Collection Time: 04/23/24  3:50 PM   Result Value Ref Range    DEVICE NASAL CANNULA      POC pH 7.39 7.35 - 7.45      POC pCO2 23.4 (L) 35 - 45 MMHG    POC PO2 57 (L) 75 - 100 MMHG    POC HCO3 14.2 (L) 22 - 26 MMOL/L    POC O2 SAT 89.7 (L) 95 - 98 %    Base Deficit (POC) 9.1 mmol/L    POC Raoul's Test Positive      Site RIGHT RADIAL      Specimen type: ARTERIAL      Performed by: Tena     Respiratory Comment: 5lpm        No results for input(s): \"COVID19\" in the last 72 hours.    CT Head W/O Contrast    Result Date: 4/23/2024  Head CT INDICATION: Subdural TECHNIQUE: Multiple 2D axial images obtained through the brain without intravenous contrast.  Radiation dose reduction techniques were used for this study:  All CT scans performed at this facility use one or all of the following: Automated exposure control, adjustment of the mA and/or kVp according to patient's size, iterative reconstruction. COMPARISON: CT of the brain, February 16 2024 FINDINGS: Area of subdural collection 2.3 cm noted along the right cerebral convexity containing blood products of mixed ages likely subacute on chronic. This appears to be larger than on prior, causing right to left midline shift. Ventriculomegaly with small vessel chronic ischemic changes are noted. Left-sided chronic subdural collection is stable measuring 5 mm. Dilatation of the temporal horns noted. The sinuses are clear. There are no bony lesions.     1. Bilateral subdural collections are again noted. The

## 2024-04-23 NOTE — PROGRESS NOTES
NEUROSURGERY PLAN OF CARE NOTE:     Chart and Imaging Reviewed: Patient Discussed with Dr. Vaishnavi Zamora 88 y.o. female with advance age, dementia who is DNR and medical multiple other medical-morbidities including presented to [facility] with AMS, tachycardia, leukocytosis, hypoxia. I have independently reviewed and interpreted the CT Head WO which demonstrates a bifrontal subdural hematomas with interval enlargement with acute on chronic subdural hematoma of the right frontal subdural hematoma with minimal 3 mm of right to left shift.  Recommend discuss goals of care given she she is DNR to determine if family would ever want any lifesaving, non-morbidity changing, life-saving neurosurgical intervention if the need were to ever arise.Please call with questions or concerns. Full in person neurosurgical consultation services are not provided at [facility] at this time.     I spent a total of 5-10 minutes of medical consultative discussion and review.     Toni San MD, FAANS  Neurosurgeon  [facility]  [facility]

## 2024-04-23 NOTE — PROGRESS NOTES
TRANSFER - IN REPORT:    Verbal report received from Jennifer SAM RN on Alexys Zamora  being received from [facility] ED for routine progression of patient care      Report consisted of patient's Situation, Background, Assessment and   Recommendations (SBAR).     Information from the following report(s) Nurse Handoff Report and Cardiac Rhythm Sinus Tachycardia  was reviewed with the receiving nurse.    Opportunity for questions and clarification was provided.      Assessment completed upon patient's arrival to unit and care assumed.

## 2024-04-24 ENCOUNTER — APPOINTMENT (OUTPATIENT)
Dept: NON INVASIVE DIAGNOSTICS | Age: 89
End: 2024-04-24
Attending: STUDENT IN AN ORGANIZED HEALTH CARE EDUCATION/TRAINING PROGRAM
Payer: MEDICARE

## 2024-04-24 PROBLEM — J15.9 BACTERIAL PNEUMONIA: Status: ACTIVE | Noted: 2024-04-24

## 2024-04-24 PROBLEM — J86.9 EMPYEMA LUNG (HCC): Status: ACTIVE | Noted: 2024-04-24

## 2024-04-24 PROBLEM — A41.9 SEPSIS (HCC): Status: ACTIVE | Noted: 2024-04-24

## 2024-04-24 LAB
ACCESSION NUMBER, LLC1M: ABNORMAL
ACINETOBACTER CALCOAC BAUMANNII COMPLEX BY PCR: NOT DETECTED
ALBUMIN SERPL-MCNC: 2 G/DL (ref 3.2–4.6)
ALBUMIN/GLOB SERPL: 0.5 (ref 1–1.9)
ALP SERPL-CCNC: 210 U/L (ref 35–104)
ALT SERPL-CCNC: 14 U/L (ref 12–65)
ANION GAP SERPL CALC-SCNC: 19 MMOL/L (ref 9–18)
ARTERIAL PATENCY WRIST A: POSITIVE
AST SERPL-CCNC: 37 U/L (ref 15–37)
B FRAGILIS DNA BLD POS QL NAA+NON-PROBE: NOT DETECTED
B PERT DNA SPEC QL NAA+PROBE: NOT DETECTED
BASE DEFICIT BLD-SCNC: 3.4 MMOL/L
BASOPHILS # BLD: 0 K/UL (ref 0–0.2)
BASOPHILS NFR BLD: 0 % (ref 0–2)
BDY SITE: ABNORMAL
BILIRUB SERPL-MCNC: 0.5 MG/DL (ref 0–1.2)
BIOFIRE TEST COMMENT: ABNORMAL
BODY FLD TYPE: NORMAL
BORDETELLA PARAPERTUSSIS BY PCR: NOT DETECTED
BUN SERPL-MCNC: 41 MG/DL (ref 8–23)
C ALBICANS DNA BLD POS QL NAA+NON-PROBE: NOT DETECTED
C AURIS DNA BLD POS QL NAA+NON-PROBE: NOT DETECTED
C GATTII+NEOFOR DNA BLD POS QL NAA+N-PRB: NOT DETECTED
C GLABRATA DNA BLD POS QL NAA+NON-PROBE: NOT DETECTED
C KRUSEI DNA BLD POS QL NAA+NON-PROBE: NOT DETECTED
C PARAP DNA BLD POS QL NAA+NON-PROBE: NOT DETECTED
C PNEUM DNA SPEC QL NAA+PROBE: NOT DETECTED
C TROPICLS DNA BLD POS QL NAA+NON-PROBE: NOT DETECTED
CALCIUM SERPL-MCNC: 8.6 MG/DL (ref 8.8–10.2)
CHLORIDE SERPL-SCNC: 110 MMOL/L (ref 98–107)
CO2 SERPL-SCNC: 18 MMOL/L (ref 20–28)
CREAT SERPL-MCNC: 0.96 MG/DL (ref 0.6–1.1)
DIFFERENTIAL METHOD BLD: ABNORMAL
E CLOAC COMP DNA BLD POS NAA+NON-PROBE: NOT DETECTED
E COLI DNA BLD POS QL NAA+NON-PROBE: NOT DETECTED
E FAECALIS DNA BLD POS QL NAA+NON-PROBE: NOT DETECTED
E FAECIUM DNA BLD POS QL NAA+NON-PROBE: NOT DETECTED
ECHO AO ASC DIAM: 2.6 CM
ECHO AO ASCENDING AORTA INDEX: 1.95 CM/M2
ECHO AO ROOT DIAM: 2.8 CM
ECHO AO ROOT INDEX: 2.11 CM/M2
ECHO AV AREA PEAK VELOCITY: 1.4 CM2
ECHO AV AREA VTI: 1.5 CM2
ECHO AV AREA/BSA PEAK VELOCITY: 1.1 CM2/M2
ECHO AV AREA/BSA VTI: 1.1 CM2/M2
ECHO AV MEAN GRADIENT: 4 MMHG
ECHO AV MEAN VELOCITY: 0.9 M/S
ECHO AV PEAK GRADIENT: 6 MMHG
ECHO AV PEAK VELOCITY: 1.3 M/S
ECHO AV VELOCITY RATIO: 0.54
ECHO AV VTI: 27.5 CM
ECHO BSA: 1.35 M2
ECHO EST RA PRESSURE: 3 MMHG
ECHO IVC EXP: 1.7 CM
ECHO LA AREA 2C: 12.5 CM2
ECHO LA AREA 4C: 14.7 CM2
ECHO LA DIAMETER INDEX: 2.18 CM/M2
ECHO LA DIAMETER: 2.9 CM
ECHO LA MAJOR AXIS: 5.6 CM
ECHO LA MINOR AXIS: 4.3 CM
ECHO LA TO AORTIC ROOT RATIO: 1.04
ECHO LA VOL MOD A2C: 30 ML (ref 22–52)
ECHO LA VOL MOD A4C: 31 ML (ref 22–52)
ECHO LA VOLUME INDEX MOD A2C: 23 ML/M2 (ref 16–34)
ECHO LA VOLUME INDEX MOD A4C: 23 ML/M2 (ref 16–34)
ECHO LV E' LATERAL VELOCITY: 5 CM/S
ECHO LV E' SEPTAL VELOCITY: 5 CM/S
ECHO LV EDV A2C: 69 ML
ECHO LV EDV A4C: 75 ML
ECHO LV EDV INDEX A4C: 56 ML/M2
ECHO LV EDV NDEX A2C: 52 ML/M2
ECHO LV EJECTION FRACTION A2C: 59 %
ECHO LV EJECTION FRACTION A4C: 58 %
ECHO LV EJECTION FRACTION BIPLANE: 59 % (ref 55–100)
ECHO LV ESV A2C: 29 ML
ECHO LV ESV A4C: 32 ML
ECHO LV ESV INDEX A2C: 22 ML/M2
ECHO LV ESV INDEX A4C: 24 ML/M2
ECHO LV FRACTIONAL SHORTENING: 29 % (ref 28–44)
ECHO LV INTERNAL DIMENSION DIASTOLE INDEX: 3.08 CM/M2
ECHO LV INTERNAL DIMENSION DIASTOLIC: 4.1 CM (ref 3.9–5.3)
ECHO LV INTERNAL DIMENSION SYSTOLIC INDEX: 2.18 CM/M2
ECHO LV INTERNAL DIMENSION SYSTOLIC: 2.9 CM
ECHO LVOT AREA: 2.5 CM2
ECHO LVOT AV VTI INDEX: 0.57
ECHO LVOT DIAM: 1.8 CM
ECHO LVOT MEAN GRADIENT: 1 MMHG
ECHO LVOT PEAK GRADIENT: 2 MMHG
ECHO LVOT PEAK VELOCITY: 0.7 M/S
ECHO LVOT STROKE VOLUME INDEX: 30 ML/M2
ECHO LVOT SV: 39.9 ML
ECHO LVOT VTI: 15.7 CM
ECHO MV A VELOCITY: 0.93 M/S
ECHO MV AREA VTI: 1.7 CM2
ECHO MV E DECELERATION TIME (DT): 221 MS
ECHO MV E VELOCITY: 1.01 M/S
ECHO MV E/A RATIO: 1.09
ECHO MV E/E' LATERAL: 20.2
ECHO MV E/E' RATIO (AVERAGED): 20.2
ECHO MV LVOT VTI INDEX: 1.51
ECHO MV MAX VELOCITY: 1 M/S
ECHO MV MEAN GRADIENT: 2 MMHG
ECHO MV MEAN VELOCITY: 0.7 M/S
ECHO MV PEAK GRADIENT: 4 MMHG
ECHO MV VTI: 23.7 CM
ECHO PV ACCELERATION TIME (AT): 69 MS
ECHO PV MAX VELOCITY: 1.1 M/S
ECHO PV PEAK GRADIENT: 5 MMHG
ECHO RIGHT VENTRICULAR SYSTOLIC PRESSURE (RVSP): 39 MMHG
ECHO RV FREE WALL PEAK S': 10 CM/S
ECHO RV TAPSE: 2 CM (ref 1.7–?)
ECHO TV REGURGITANT MAX VELOCITY: 3.02 M/S
ECHO TV REGURGITANT PEAK GRADIENT: 36 MMHG
ENTEROBACTERALES DNA BLD POS NAA+N-PRB: NOT DETECTED
EOSINOPHIL # BLD: 0 K/UL (ref 0–0.8)
EOSINOPHIL NFR BLD: 0 % (ref 0.5–7.8)
ERYTHROCYTE [DISTWIDTH] IN BLOOD BY AUTOMATED COUNT: 14.3 % (ref 11.9–14.6)
FLUAV SUBTYP SPEC NAA+PROBE: NOT DETECTED
FLUBV RNA SPEC QL NAA+PROBE: NOT DETECTED
GAS FLOW.O2 O2 DELIVERY SYS: ABNORMAL
GLOBULIN SER CALC-MCNC: 4 G/DL (ref 2.3–3.5)
GLUCOSE BLD STRIP.AUTO-MCNC: 128 MG/DL (ref 65–100)
GLUCOSE SERPL-MCNC: 126 MG/DL (ref 70–99)
GP B STREP DNA BLD POS QL NAA+NON-PROBE: NOT DETECTED
HADV DNA SPEC QL NAA+PROBE: NOT DETECTED
HAEM INFLU DNA BLD POS QL NAA+NON-PROBE: NOT DETECTED
HCO3 BLD-SCNC: 19.3 MMOL/L (ref 22–26)
HCOV 229E RNA SPEC QL NAA+PROBE: NOT DETECTED
HCOV HKU1 RNA SPEC QL NAA+PROBE: NOT DETECTED
HCOV NL63 RNA SPEC QL NAA+PROBE: NOT DETECTED
HCOV OC43 RNA SPEC QL NAA+PROBE: NOT DETECTED
HCT VFR BLD AUTO: 29.8 % (ref 35.8–46.3)
HGB BLD-MCNC: 9.9 G/DL (ref 11.7–15.4)
HMPV RNA SPEC QL NAA+PROBE: DETECTED
HPIV1 RNA SPEC QL NAA+PROBE: NOT DETECTED
HPIV2 RNA SPEC QL NAA+PROBE: NOT DETECTED
HPIV3 RNA SPEC QL NAA+PROBE: NOT DETECTED
HPIV4 RNA SPEC QL NAA+PROBE: NOT DETECTED
IMM GRANULOCYTES # BLD AUTO: 0.3 K/UL (ref 0–0.5)
IMM GRANULOCYTES NFR BLD AUTO: 1 % (ref 0–5)
K OXYTOCA DNA BLD POS QL NAA+NON-PROBE: NOT DETECTED
KLEBSIELLA SP DNA BLD POS QL NAA+NON-PRB: NOT DETECTED
KLEBSIELLA SP DNA BLD POS QL NAA+NON-PRB: NOT DETECTED
L MONOCYTOG DNA BLD POS QL NAA+NON-PROBE: NOT DETECTED
LYMPHOCYTES # BLD: 0.3 K/UL (ref 0.5–4.6)
LYMPHOCYTES NFR BLD: 1 % (ref 13–44)
M PNEUMO DNA SPEC QL NAA+PROBE: NOT DETECTED
MAGNESIUM SERPL-MCNC: 2.6 MG/DL (ref 1.8–2.4)
MCH RBC QN AUTO: 29.8 PG (ref 26.1–32.9)
MCHC RBC AUTO-ENTMCNC: 33.2 G/DL (ref 31.4–35)
MCV RBC AUTO: 89.8 FL (ref 82–102)
MONOCYTES # BLD: 0.6 K/UL (ref 0.1–1.3)
MONOCYTES NFR BLD: 2 % (ref 4–12)
MRSA DNA SPEC QL NAA+PROBE: NOT DETECTED
N MEN DNA BLD POS QL NAA+NON-PROBE: NOT DETECTED
NEUTS SEG # BLD: 29.8 K/UL (ref 1.7–8.2)
NEUTS SEG NFR BLD: 96 % (ref 43–78)
NRBC # BLD: 0 K/UL (ref 0–0.2)
O2/TOTAL GAS SETTING VFR VENT: 50 %
P AERUGINOSA DNA BLD POS NAA+NON-PROBE: NOT DETECTED
PCO2 BLD: 26.8 MMHG (ref 35–45)
PH BLD: 7.47 (ref 7.35–7.45)
PH FLD: 6
PHOSPHATE SERPL-MCNC: 5.2 MG/DL (ref 2.5–4.5)
PLATELET # BLD AUTO: 366 K/UL (ref 150–450)
PLATELET COMMENT: ADEQUATE
PMV BLD AUTO: 8.9 FL (ref 9.4–12.3)
PO2 BLD: 76 MMHG (ref 75–100)
POTASSIUM SERPL-SCNC: 3.2 MMOL/L (ref 3.5–5.1)
PROT SERPL-MCNC: 5.9 G/DL (ref 6.3–8.2)
PROTEUS SP DNA BLD POS QL NAA+NON-PROBE: NOT DETECTED
RBC # BLD AUTO: 3.32 M/UL (ref 4.05–5.2)
RBC MORPH BLD: ABNORMAL
RESISTANT GENE TARGETS: ABNORMAL
RSV RNA SPEC QL NAA+PROBE: NOT DETECTED
RV+EV RNA SPEC QL NAA+PROBE: NOT DETECTED
S AUREUS CPE NOSE QL NAA+PROBE: NOT DETECTED
S AUREUS DNA BLD POS QL NAA+NON-PROBE: NOT DETECTED
S AUREUS+CONS DNA BLD POS NAA+NON-PROBE: NOT DETECTED
S EPIDERMIDIS DNA BLD POS QL NAA+NON-PRB: NOT DETECTED
S LUGDUNENSIS DNA BLD POS QL NAA+NON-PRB: NOT DETECTED
S MALTOPHILIA DNA BLD POS QL NAA+NON-PRB: NOT DETECTED
S MARCESCENS DNA BLD POS NAA+NON-PROBE: NOT DETECTED
S PNEUM DNA BLD POS QL NAA+NON-PROBE: DETECTED
S PYO DNA BLD POS QL NAA+NON-PROBE: NOT DETECTED
SALMONELLA DNA BLD POS QL NAA+NON-PROBE: NOT DETECTED
SAO2 % BLD: 96.1 % (ref 95–98)
SARS-COV-2 RNA RESP QL NAA+PROBE: NOT DETECTED
SERVICE CMNT-IMP: ABNORMAL
SERVICE CMNT-IMP: ABNORMAL
SODIUM SERPL-SCNC: 147 MMOL/L (ref 136–145)
SPECIMEN TYPE: ABNORMAL
STREPTOCOCCUS DNA BLD POS NAA+NON-PROBE: DETECTED
VANCOMYCIN SERPL-MCNC: 8.3 UG/ML
WBC # BLD AUTO: 31 K/UL (ref 4.3–11.1)
WBC MORPH BLD: ABNORMAL

## 2024-04-24 PROCEDURE — 93306 TTE W/DOPPLER COMPLETE: CPT | Performed by: INTERNAL MEDICINE

## 2024-04-24 PROCEDURE — 87070 CULTURE OTHR SPECIMN AEROBIC: CPT

## 2024-04-24 PROCEDURE — 36415 COLL VENOUS BLD VENIPUNCTURE: CPT

## 2024-04-24 PROCEDURE — 82945 GLUCOSE OTHER FLUID: CPT

## 2024-04-24 PROCEDURE — 82962 GLUCOSE BLOOD TEST: CPT

## 2024-04-24 PROCEDURE — 2500000003 HC RX 250 WO HCPCS: Performed by: INTERNAL MEDICINE

## 2024-04-24 PROCEDURE — 99223 1ST HOSP IP/OBS HIGH 75: CPT | Performed by: INTERNAL MEDICINE

## 2024-04-24 PROCEDURE — 87077 CULTURE AEROBIC IDENTIFY: CPT

## 2024-04-24 PROCEDURE — 94761 N-INVAS EAR/PLS OXIMETRY MLT: CPT

## 2024-04-24 PROCEDURE — 83986 ASSAY PH BODY FLUID NOS: CPT

## 2024-04-24 PROCEDURE — 87186 SC STD MICRODIL/AGAR DIL: CPT

## 2024-04-24 PROCEDURE — 84100 ASSAY OF PHOSPHORUS: CPT

## 2024-04-24 PROCEDURE — 87205 SMEAR GRAM STAIN: CPT

## 2024-04-24 PROCEDURE — C8929 TTE W OR WO FOL WCON,DOPPLER: HCPCS

## 2024-04-24 PROCEDURE — 89050 BODY FLUID CELL COUNT: CPT

## 2024-04-24 PROCEDURE — 36600 WITHDRAWAL OF ARTERIAL BLOOD: CPT

## 2024-04-24 PROCEDURE — 85025 COMPLETE CBC W/AUTO DIFF WBC: CPT

## 2024-04-24 PROCEDURE — 80053 COMPREHEN METABOLIC PANEL: CPT

## 2024-04-24 PROCEDURE — 80202 ASSAY OF VANCOMYCIN: CPT

## 2024-04-24 PROCEDURE — 83735 ASSAY OF MAGNESIUM: CPT

## 2024-04-24 PROCEDURE — 32555 ASPIRATE PLEURA W/ IMAGING: CPT | Performed by: INTERNAL MEDICINE

## 2024-04-24 PROCEDURE — 6360000002 HC RX W HCPCS: Performed by: STUDENT IN AN ORGANIZED HEALTH CARE EDUCATION/TRAINING PROGRAM

## 2024-04-24 PROCEDURE — 2700000000 HC OXYGEN THERAPY PER DAY

## 2024-04-24 PROCEDURE — 6360000002 HC RX W HCPCS: Performed by: FAMILY MEDICINE

## 2024-04-24 PROCEDURE — 82803 BLOOD GASES ANY COMBINATION: CPT

## 2024-04-24 PROCEDURE — 1100000000 HC RM PRIVATE

## 2024-04-24 PROCEDURE — 0W9B3ZZ DRAINAGE OF LEFT PLEURAL CAVITY, PERCUTANEOUS APPROACH: ICD-10-PCS | Performed by: INTERNAL MEDICINE

## 2024-04-24 PROCEDURE — 84157 ASSAY OF PROTEIN OTHER: CPT

## 2024-04-24 PROCEDURE — 2580000003 HC RX 258: Performed by: INTERNAL MEDICINE

## 2024-04-24 PROCEDURE — 2580000003 HC RX 258: Performed by: STUDENT IN AN ORGANIZED HEALTH CARE EDUCATION/TRAINING PROGRAM

## 2024-04-24 PROCEDURE — A4216 STERILE WATER/SALINE, 10 ML: HCPCS | Performed by: INTERNAL MEDICINE

## 2024-04-24 PROCEDURE — 83615 LACTATE (LD) (LDH) ENZYME: CPT

## 2024-04-24 PROCEDURE — 6360000004 HC RX CONTRAST MEDICATION: Performed by: INTERNAL MEDICINE

## 2024-04-24 RX ORDER — GLYCOPYRROLATE 0.2 MG/ML
0.2 INJECTION INTRAMUSCULAR; INTRAVENOUS EVERY 4 HOURS PRN
Status: DISCONTINUED | OUTPATIENT
Start: 2024-04-24 | End: 2024-04-26 | Stop reason: HOSPADM

## 2024-04-24 RX ORDER — LOPERAMIDE HYDROCHLORIDE 2 MG/1
2 CAPSULE ORAL PRN
Status: DISCONTINUED | OUTPATIENT
Start: 2024-04-24 | End: 2024-04-26 | Stop reason: HOSPADM

## 2024-04-24 RX ORDER — MORPHINE SULFATE 4 MG/ML
4 INJECTION, SOLUTION INTRAMUSCULAR; INTRAVENOUS
Status: DISCONTINUED | OUTPATIENT
Start: 2024-04-24 | End: 2024-04-25

## 2024-04-24 RX ORDER — MORPHINE SULFATE 2 MG/ML
2 INJECTION, SOLUTION INTRAMUSCULAR; INTRAVENOUS
Status: DISCONTINUED | OUTPATIENT
Start: 2024-04-24 | End: 2024-04-25

## 2024-04-24 RX ORDER — POTASSIUM CHLORIDE 7.45 MG/ML
10 INJECTION INTRAVENOUS
Status: COMPLETED | OUTPATIENT
Start: 2024-04-24 | End: 2024-04-24

## 2024-04-24 RX ORDER — MORPHINE SULFATE 20 MG/ML
5 SOLUTION ORAL
Status: DISCONTINUED | OUTPATIENT
Start: 2024-04-24 | End: 2024-04-26 | Stop reason: HOSPADM

## 2024-04-24 RX ORDER — BISACODYL 10 MG
10 SUPPOSITORY, RECTAL RECTAL DAILY PRN
Status: DISCONTINUED | OUTPATIENT
Start: 2024-04-24 | End: 2024-04-26 | Stop reason: HOSPADM

## 2024-04-24 RX ADMIN — POTASSIUM CHLORIDE 10 MEQ: 7.46 INJECTION, SOLUTION INTRAVENOUS at 09:09

## 2024-04-24 RX ADMIN — SODIUM CHLORIDE, PRESERVATIVE FREE 10 ML: 5 INJECTION INTRAVENOUS at 09:25

## 2024-04-24 RX ADMIN — POTASSIUM CHLORIDE 10 MEQ: 7.46 INJECTION, SOLUTION INTRAVENOUS at 06:38

## 2024-04-24 RX ADMIN — POTASSIUM CHLORIDE 10 MEQ: 7.46 INJECTION, SOLUTION INTRAVENOUS at 05:40

## 2024-04-24 RX ADMIN — CEFEPIME 1000 MG: 1 INJECTION, POWDER, FOR SOLUTION INTRAMUSCULAR; INTRAVENOUS at 04:30

## 2024-04-24 RX ADMIN — POTASSIUM CHLORIDE 10 MEQ: 7.46 INJECTION, SOLUTION INTRAVENOUS at 07:47

## 2024-04-24 RX ADMIN — PERFLUTREN 0.45 ML: 6.52 INJECTION, SUSPENSION INTRAVENOUS at 11:28

## 2024-04-24 RX ADMIN — SODIUM CHLORIDE 5 ML/HR: 9 INJECTION, SOLUTION INTRAVENOUS at 04:30

## 2024-04-24 RX ADMIN — SODIUM CHLORIDE, PRESERVATIVE FREE 10 ML: 5 INJECTION INTRAVENOUS at 21:01

## 2024-04-24 RX ADMIN — TUBERCULIN PURIFIED PROTEIN DERIVATIVE 5 UNITS: 5 INJECTION, SOLUTION INTRADERMAL at 09:27

## 2024-04-24 NOTE — CARE COORDINATION
Phone call received from the liaison with [facility]. The patient currently does not meet admission criteria. Per the liaison, the referral will be reviewed again tomorrow.

## 2024-04-24 NOTE — PROGRESS NOTES
Hematocrit 29.8 (L) 35.8 - 46.3 %    MCV 89.8 82.0 - 102.0 FL    MCH 29.8 26.1 - 32.9 PG    MCHC 33.2 31.4 - 35.0 g/dL    RDW 14.3 11.9 - 14.6 %    Platelets 366 150 - 450 K/uL    MPV 8.9 (L) 9.4 - 12.3 FL    nRBC 0.00 0.0 - 0.2 K/uL    Neutrophils % 96 (H) 43 - 78 %    Lymphocytes % 1 (L) 13 - 44 %    Monocytes % 2 (L) 4.0 - 12.0 %    Eosinophils % 0 (L) 0.5 - 7.8 %    Basophils % 0 0.0 - 2.0 %    Immature Granulocytes % 1 0.0 - 5.0 %    Neutrophils Absolute 29.8 (H) 1.7 - 8.2 K/UL    Lymphocytes Absolute 0.3 (L) 0.5 - 4.6 K/UL    Monocytes Absolute 0.6 0.1 - 1.3 K/UL    Eosinophils Absolute 0.0 0.0 - 0.8 K/UL    Basophils Absolute 0.0 0.0 - 0.2 K/UL    Immature Granulocytes Absolute 0.3 0.0 - 0.5 K/UL    RBC Comment SLIGHT  ANISOCYTOSIS + POIKILOCYTOSIS        WBC Comment Result Confirmed By Smear      Platelet Comment ADEQUATE      Differential Type AUTOMATED     Phosphorus    Collection Time: 04/24/24  3:17 AM   Result Value Ref Range    Phosphorus 5.2 (H) 2.5 - 4.5 MG/DL   Vancomycin Level, Random    Collection Time: 04/24/24  3:17 AM   Result Value Ref Range    Vancomycin Rm 8.3 UG/ML   Magnesium    Collection Time: 04/24/24  3:17 AM   Result Value Ref Range    Magnesium 2.6 (H) 1.8 - 2.4 mg/dL   Arterial Blood Gas, POC    Collection Time: 04/24/24  3:31 AM   Result Value Ref Range    DEVICE NASAL CANNULA      FIO2 50 %    POC pH 7.47 (H) 7.35 - 7.45      POC pCO2 26.8 (L) 35 - 45 MMHG    POC PO2 76 75 - 100 MMHG    POC HCO3 19.3 (L) 22 - 26 MMOL/L    POC O2 SAT 96.1 95 - 98 %    Base Deficit (POC) 3.4 mmol/L    POC Raoul's Test Positive      Site LEFT RADIAL      Specimen type: ARTERIAL      Performed by: Jenni    POCT Glucose    Collection Time: 04/24/24  7:41 AM   Result Value Ref Range    POC Glucose 128 (H) 65 - 100 mg/dL    Performed by: Dawit        Recent Labs     04/23/24  1921   COVID19 NOT DETECTED       Current Meds:  Current Facility-Administered Medications   Medication Dose  Route Frequency    potassium chloride 10 mEq/100 mL IVPB (Peripheral Line)  10 mEq IntraVENous Q1H    vancomycin (VANCOCIN) 750 mg in sodium chloride 0.9 % 250 mL IVPB ([id number])  750 mg IntraVENous Q18H    cefTRIAXone (ROCEPHIN) 2,000 mg in sodium chloride 0.9 % 50 mL IVPB (mini-bag)  2,000 mg IntraVENous Q24H    sodium chloride flush 0.9 % injection 5-40 mL  5-40 mL IntraVENous 2 times per day    sodium chloride flush 0.9 % injection 5-40 mL  5-40 mL IntraVENous PRN    0.9 % sodium chloride infusion   IntraVENous PRN    ondansetron (ZOFRAN-ODT) disintegrating tablet 4 mg  4 mg Oral Q8H PRN    Or    ondansetron (ZOFRAN) injection 4 mg  4 mg IntraVENous Q6H PRN    polyethylene glycol (GLYCOLAX) packet 17 g  17 g Oral Daily PRN    [Held by provider] amLODIPine (NORVASC) tablet 10 mg  10 mg Oral Daily    [Held by provider] atorvastatin (LIPITOR) tablet 40 mg  40 mg Oral Nightly    [Held by provider] memantine (NAMENDA) tablet 10 mg  10 mg Oral BID    [Held by provider] QUEtiapine (SEROQUEL) tablet 12.5 mg  12.5 mg Oral Daily    [Held by provider] QUEtiapine (SEROQUEL) tablet 25 mg  25 mg Oral Nightly    [Held by provider] sertraline (ZOLOFT) tablet 50 mg  50 mg Oral Daily    [Held by provider] Vitamin D (CHOLECALCIFEROL) tablet 2,000 Units  2,000 Units Oral Daily    acetaminophen (TYLENOL) tablet 650 mg  650 mg Oral Q6H PRN    acetaminophen (TYLENOL) suppository 650 mg  650 mg Rectal Q4H PRN       Signed:  Alban Rahman MD    Part of this note may have been written by using a voice dictation software.  The note has been proof read but may still contain some grammatical/other typographical errors.

## 2024-04-24 NOTE — PLAN OF CARE
Problem: Safety - Adult  Goal: Free from fall injury  4/24/2024 1922 by Micheal Lazo, RN  Outcome: Progressing  4/24/2024 0829 by Shalini Arechiga RN  Outcome: Not Progressing     Problem: Discharge Planning  Goal: Discharge to home or other facility with appropriate resources  4/24/2024 1922 by Micheal Lazo RN  Outcome: Progressing  Flowsheets (Taken 4/24/2024 0839 by Shalini Arechiga, RN)  Discharge to home or other facility with appropriate resources: Identify barriers to discharge with patient and caregiver  4/24/2024 0829 by Shalini Arechiga RN  Outcome: Not Progressing     Problem: Pain  Goal: Verbalizes/displays adequate comfort level or baseline comfort level  4/24/2024 1922 by Micheal Lazo RN  Outcome: Progressing  4/24/2024 0829 by Shalini Arechiga RN  Outcome: Not Progressing     Problem: Skin/Tissue Integrity  Goal: Absence of new skin breakdown  Description: 1.  Monitor for areas of redness and/or skin breakdown  2.  Assess vascular access sites hourly  3.  Every 4-6 hours minimum:  Change oxygen saturation probe site  4.  Every 4-6 hours:  If on nasal continuous positive airway pressure, respiratory therapy assess nares and determine need for appliance change or resting period.  4/24/2024 1922 by Micheal Lazo RN  Outcome: Progressing  4/24/2024 0829 by Shalini Arechiga RN  Outcome: Not Progressing

## 2024-04-24 NOTE — ACP (ADVANCE CARE PLANNING)
[facility] Hospitalist Service  At the heart of better care     Advance Care Planning   Admit Date:  2024  1:11 PM   Name:  Alexys Zamora   Age:  88 y.o.  Sex:  female  :  1935   MRN:  [id number]   Room:  [facility]/    Alexys Zamora has capacity to make her own decisions:   No    If pt unable to make decisions, POA/surrogate decision maker:  Daughter     Other people present:   None two daughters present for conversation    Patient / surrogate decision-maker directed code status:  DNR/DNI    Other ACP topics discussed, if applicable:   Discussed possibility of hospice or comfort measures.    Family would like to leave oxygen on and pursue hospice placement.  We can also initiate comfort measures while in house and they are agreeable to that as well.  Discussed with  and nursing as well.  Hospice consult already placed yesterday.  Will try to get her to normal nasal cannula and transfer to hospice house.    Patient or surrogate consented to discussion of the current conditions, workup, management plans, prognosis, and the risk for further deterioration.  Time spent: 20 minutes in direct discussion.      Signed:  Alban Rahman MD

## 2024-04-24 NOTE — CARE COORDINATION
Discharge planning was discussed with the Critical Care Interdisciplinary Team. The patient has a hospice consult. Discharge planning is pending the patient's clinical course in the hospital.

## 2024-04-24 NOTE — PROGRESS NOTES
Liaison met with masoud Willis at bedside to discuss hospice philosophy of care, hospice team members, and frequency of visits. We also discussed the [facility] for general inpatient care with symptom management and respite care. Liaison answered all questions appropriately.     Patient does not currently meet criteria for GIP care at this time. GIP services, at [facility], requires a significant symptom burden that requires high frequency of PRN administration to help maintain comfort.     The patient is appropriate for [facility] level of care. [facility] level of care is an available service which can be provided at home, SNF or [facility].    [facility] will continue to follow pt and revaluate for appropriateness.

## 2024-04-24 NOTE — H&P (VIEW-ONLY)
PULMONARY/CRITICAL CARE CONSULT NOTE           4/24/2024    Alexys Zamora                        Date of Admission:  4/23/2024    The patient's chart is reviewed and the patient is discussed with the staff.    Subjective:     Patient is a 88 y.o.  female seen and evaluated at the request of Dr. Rahman.  Alexys Zamora is a 88 y.o. female with medical history of dementia currently in memory care unit, wheelchair-bound.  Was brought to the ER with hypoxia.  It was associated with cough, congestion.  They were unable to wake her up in the morning.  Patient was not following commands and unable to provide history.  History was obtained from the daughter at the bedside.  She stated she got a call from the memory care unit stating that they are unable to pick her up.  Of note, patient was recently admitted in the hospital for subdural hematoma and she was declining since then.     ED course afebrile, /58, saturating 88% on room air.  Labs significant for leukocytosis of 25 K, K 3.2, CO2 17, BUN/creatinine 49/1.01, LA 1.3, blood glucose 97, anion gap 22, procalcitonin 4.3.  Urine analysis negative.  CTA chest showed bilateral subdural hematomas and right to left midline shift.  Chest x-ray showed left pleural effusion.  Admitted for sepsis from bacterial PNA.  We are asked to assess for pleural effusion  +metapneumovirus underlying.    Unable to obtain any history from patient, per nursing family leaning towards hospice care    Blood culture is positive for streptococcus pneumonia      Review of Systems: Unable to obtain due to patient factors.     Current Outpatient Medications   Medication Instructions    acetaminophen (TYLENOL) 500 mg, Oral, EVERY 8 HOURS PRN    amLODIPine (NORVASC) 10 mg, Oral, DAILY    atorvastatin (LIPITOR) 40 mg, Oral, NIGHTLY    memantine (NAMENDA) 10 mg, Oral, 2 TIMES DAILY    QUEtiapine (SEROQUEL) 12.5 mg, Oral, DAILY    QUEtiapine (SEROQUEL) 25 mg, Oral, NIGHTLY

## 2024-04-24 NOTE — PROGRESS NOTES
VANCO DAILY FOLLOW UP NOTE  [facility]   Pharmacy Pharmacokinetic Monitoring Service - Vancomycin    Consulting Provider: Vaishnavi   Indication: HAP  Target Concentration: Goal AUC/TAMMY 400-600 mg*hr/L  Day of Therapy: 2  Additional Antimicrobials: Rocephin    Pertinent Laboratory Values:   Wt Readings from Last 1 Encounters:   04/23/24 40.9 kg (90 lb 3.2 oz)     Temp Readings from Last 1 Encounters:   04/24/24 98.5 °F (36.9 °C) (Oral)     Recent Labs     04/23/24  1320 04/24/24  0317   BUN 49* 41*   CREATININE 1.01 0.96   WBC 25.8* 31.0*   PROCAL 4.43*  --      Estimated Creatinine Clearance: 26 mL/min (based on SCr of 0.96 mg/dL).    Lab Results   Component Value Date/Time    VANCORANDOM 8.3 04/24/2024 03:17 AM       MRSA Nasal Swab: not ordered. Order placed by pharmacy    Assessment:  Date/Time Dose Concentration AUC         Note: Serum concentrations collected for AUC dosing may appear elevated if collected in close proximity to the dose administered, this is not necessarily an indication of toxicity    Plan:  Dosing recommendations based on Bayesian software  Start vancomycin 750mg IV q18h after loading dose  Anticipated AUC of 600 and trough concentration of 20.3 at steady state based on basian kinetics, but patient demonstrating better clearance that this.  Renal labs as indicated   Vancomycin concentrations will be ordered as clinically appropriate   Pharmacy will continue to monitor patient and adjust therapy as indicated    Thank you for the consult,    Tesfaye Garrison, ManuelD, BCPS

## 2024-04-24 NOTE — PLAN OF CARE
Problem: Safety - Adult  Goal: Free from fall injury  4/24/2024 0829 by Shalini Arechiga RN  Outcome: Not Progressing  4/23/2024 2337 by Yosef Lacy RN  Outcome: Progressing  Flowsheets (Taken 4/23/2024 2000)  Free From Fall Injury: Instruct family/caregiver on patient safety     Problem: Discharge Planning  Goal: Discharge to home or other facility with appropriate resources  4/24/2024 0829 by Shalini Arechiga RN  Outcome: Not Progressing  4/23/2024 2337 by Yosef Lacy RN  Outcome: Progressing  Flowsheets (Taken 4/23/2024 1953)  Discharge to home or other facility with appropriate resources:   Identify barriers to discharge with patient and caregiver   Arrange for needed discharge resources and transportation as appropriate   Identify discharge learning needs (meds, wound care, etc)   Refer to discharge planning if patient needs post-hospital services based on physician order or complex needs related to functional status, cognitive ability or social support system     Problem: Pain  Goal: Verbalizes/displays adequate comfort level or baseline comfort level  4/24/2024 0829 by Shalini Arechiga RN  Outcome: Not Progressing  4/23/2024 2337 by Yosef Lacy RN  Outcome: Progressing  Flowsheets (Taken 4/23/2024 1952)  Verbalizes/displays adequate comfort level or baseline comfort level:   Assess pain using appropriate pain scale   Administer analgesics based on type and severity of pain and evaluate response   Implement non-pharmacological measures as appropriate and evaluate response   Consider cultural and social influences on pain and pain management     Problem: Skin/Tissue Integrity  Goal: Absence of new skin breakdown  Description: 1.  Monitor for areas of redness and/or skin breakdown  2.  Assess vascular access sites hourly  3.  Every 4-6 hours minimum:  Change oxygen saturation probe site  4.  Every 4-6 hours:  If on nasal continuous positive airway pressure, respiratory therapy assess

## 2024-04-24 NOTE — CONSULTS
PULMONARY/CRITICAL CARE CONSULT NOTE           4/24/2024    Alexys Zamora                        Date of Admission:  4/23/2024    The patient's chart is reviewed and the patient is discussed with the staff.    Subjective:     Patient is a 88 y.o.  female seen and evaluated at the request of Dr. Rahman.  Alexys Zamora is a 88 y.o. female with medical history of dementia currently in memory care unit, wheelchair-bound.  Was brought to the ER with hypoxia.  It was associated with cough, congestion.  They were unable to wake her up in the morning.  Patient was not following commands and unable to provide history.  History was obtained from the daughter at the bedside.  She stated she got a call from the memory care unit stating that they are unable to pick her up.  Of note, patient was recently admitted in the hospital for subdural hematoma and she was declining since then.     ED course afebrile, /58, saturating 88% on room air.  Labs significant for leukocytosis of 25 K, K 3.2, CO2 17, BUN/creatinine 49/1.01, LA 1.3, blood glucose 97, anion gap 22, procalcitonin 4.3.  Urine analysis negative.  CTA chest showed bilateral subdural hematomas and right to left midline shift.  Chest x-ray showed left pleural effusion.  Admitted for sepsis from bacterial PNA.  We are asked to assess for pleural effusion  +metapneumovirus underlying.    Unable to obtain any history from patient, per nursing family leaning towards hospice care    Blood culture is positive for streptococcus pneumonia      Review of Systems: Unable to obtain due to patient factors.     Current Outpatient Medications   Medication Instructions    acetaminophen (TYLENOL) 500 mg, Oral, EVERY 8 HOURS PRN    amLODIPine (NORVASC) 10 mg, Oral, DAILY    atorvastatin (LIPITOR) 40 mg, Oral, NIGHTLY    memantine (NAMENDA) 10 mg, Oral, 2 TIMES DAILY    QUEtiapine (SEROQUEL) 12.5 mg, Oral, DAILY    QUEtiapine (SEROQUEL) 25 mg, Oral, NIGHTLY         Number of Places Lived in the Last Year: 1     Unstable Housing in the Last Year: Patient unable to answer     Family History   Problem Relation Age of Onset    Delayed Awakening Neg Hx     Rheum Arthritis Paternal Grandmother     Rheum Arthritis Sister     Heart Disease Brother         AAA plus CABG    Cancer Father     Heart Disease Mother     Post-op Cognitive Dysfunction Neg Hx     Emergence Delirium Neg Hx     Post-op Nausea/Vomiting Neg Hx     Malig Hypertherm Neg Hx     Pseudochol. Deficiency Neg Hx      Allergies   Allergen Reactions    Codeine Nausea And Vomiting    Oxycodone Nausea And Vomiting     Patient was on an empty stomach when given this med     Objective:   Blood pressure (!) 107/55, pulse 81, temperature 98.5 °F (36.9 °C), temperature source Oral, resp. rate 17, height 1.524 m (5'), weight 40.9 kg (90 lb 3.2 oz), SpO2 99 %.   Intake/Output Summary (Last 24 hours) at 4/24/2024 0844  Last data filed at 4/23/2024 2000  Gross per 24 hour   Intake 10 ml   Output --   Net 10 ml     PHYSICAL EXAM   Constitutional:  the patient is well developed and in no acute distress  EENMT:  Sclera clear, pupils equal, oral mucosa moist  Respiratory: symmetric chest rise. Decreased BS om L base  Cardiovascular:  RRR without M,G,R. There is trace lower extremity edema.  Gastrointestinal: soft and non-tender; with positive bowel sounds.  Musculoskeletal: warm without cyanosis. Normal muscle tone.   Skin:  no jaundice or rashes, no wounds   Neurologic: symmetric strength, fluent speech  Psychiatric:  calm, appropriate, oriented x 4    Imaging: I performed an independent interpretation of the patient's images.  CXR:      CT Chest:     Recent Labs     04/23/24  1320 04/24/24  0317   WBC 25.8* 31.0*   HGB 10.5* 9.9*   HCT 31.8* 29.8*    366    147*   K 3.2* 3.2*    110*   CO2 17* 18*   BUN 49* 41*   CREATININE 1.01 0.96   MG  --  2.6*   PHOS  --  5.2*   BILITOT 0.8 0.5   AST 30 37   ALT 11* 14

## 2024-04-24 NOTE — PROGRESS NOTES
Nutrition  Type and Reason for Visit: Initial, Positive Nutrition Screen  Malnutrition Screening Tool: Malnutrition Screen  Have you recently lost weight without trying?: 2 to 13 pounds (1 point)  Have you been eating poorly because of a decreased appetite?: Yes (1 point)  Malnutrition Screening Tool Score: 2 and Best Practice Alert for BMI <18.5    Nutrition Recommendations/Plan:   Noted pt now on comfort care measures only, If goals of care change and nutrition intervention desired, please consult Nutrition Services.      Rita Lacy, MARK,LD, [facility] [phone]

## 2024-04-24 NOTE — PROGRESS NOTES
PT/OT/ST      After attending interdisciplinary rounds, Dr. Rahman asked to DC ALL therapy orders. Family is leaning towards Hospice care. Pt is unable to be aroused at this time.     Please feel free to place new therapy evals if her medical status improves.     Thanks for these referrals.    Wanda Joseph, PT  Rafaela Garcia, OT  [facility]

## 2024-04-24 NOTE — PROGRESS NOTES
4 Eyes Skin Assessment     NAME:  Alexys Zamora  YOB: 1935  MEDICAL RECORD NUMBER:  [id number]    The patient is being assessed for  Admission    I agree that at least one RN has performed a thorough Head to Toe Skin Assessment on the patient. ALL assessment sites listed below have been assessed.      Areas assessed by both nurses:    Head, Face, Ears, Shoulders, Back, Chest, Arms, Elbows, Hands, Sacrum. Buttock, Coccyx, Ischium, Legs. Feet and Heels, and Under Medical Devices         Does the Patient have a Wound? Yes wound(s) were present on assessment. LDA wound assessment was Initiated and completed by ALEX Winter Prevention initiated by RN: Yes  Wound Care Orders initiated by RN: No    Pressure Injury (Stage 3,4, Unstageable, DTI, NWPT, and Complex wounds) if present, place Wound referral order by RN under : No    New Ostomies, if present place, Ostomy referral order under : No     Nurse 1 eSignature: Electronically signed by EMMA BOWMAN RN on 4/23/24 at 11:23 PM EDT    **SHARE this note so that the co-signing nurse can place an eSignature**    Nurse 2 eSignature: Electronically signed by Jeannette Callejas RN on 4/23/24 at 11:25 PM EDT

## 2024-04-24 NOTE — PROGRESS NOTES
Physician Progress Note      PATIENT:               HAMILTON VINCENT  CSN #:                  [id number]  :                       1935  ADMIT DATE:       2024 1:11 PM  DISCH DATE:  RESPONDING  PROVIDER #:        Alban Rahman MD          QUERY TEXT:    Pt admitted with sepsis/pneumonia.  Pt noted to have documentation of acute on   chronic subdural hematoma and CTH showed right to left midline shift.  If   clinically significant, please document in progress notes and discharge   summary if you are evaluating/treating any of the following:    The medical record reflects the following:  Risk Factors: 88yoF with SDH  Clinical Indicators: CTH, Bilateral subdural collections are again noted. The   right side contains mixed  aged blood products but has significantly increased in size exerting right to  left midline shift.  Treatment: Neurosurgery consult. Comfort care.  Options provided:  -- Cerebral edema  -- Brain compression  -- Cerebral edema and Brain compression  -- Traumatic brain compression  -- Other - I will add my own diagnosis  -- Disagree - Not applicable / Not valid  -- Disagree - Clinically unable to determine / Unknown  -- Refer to Clinical Documentation Reviewer    PROVIDER RESPONSE TEXT:    This patient has brain compression.    Query created by: PILY TEJADA on 2024 1:47 PM      Electronically signed by:  Alban Rahman MD 2024 3:04 PM

## 2024-04-24 NOTE — INTERVAL H&P NOTE
Update History & Physical    The patient's History and Physical of April 24, 2024 was reviewed with the patient and I examined the patient. There was no change. The surgical site was confirmed by the patient and me.     Plan: The risks, benefits, expected outcome, and alternative to the recommended procedure have been discussed with the patient. Patient understands and wants to proceed with the procedure.     Electronically signed by Gopal Syed MD on 4/24/2024 at 9:03 AM

## 2024-04-24 NOTE — CARE COORDINATION
04/24/24 1212   Service Assessment   Patient Orientation Unable to Assess   Cognition Dementia / Early Alzheimer's   History Provided By Child/Family  (daughter Alba)   Primary Caregiver Other (Comment)  ([facility])   Support Systems Children   PCP Verified by CM Yes   Prior Functional Level Assistance with the following:   Current Functional Level Assistance with the following:   Can patient return to prior living arrangement Unknown at present   Ability to make needs known: Unable   Family able to assist with home care needs: No   Would you like for me to discuss the discharge plan with any other family members/significant others, and if so, who? Yes  (daughters)   Financial Resources Medicare   Community Resources Assisted Living  (memory care unit)   Social/Functional History   Lives With Daughter   Type of Home Assisted living   Bathroom Equipment None   Home Equipment Rollator;Wheelchair-manual;Cane;Walker, rolling   ADL Assistance Needs assistance   Discharge Planning   Type of Residence Assisted living   Current Services Prior To Admission Durable Medical Equipment   Potential Assistance Needed Other (Comment)  (hospice)   Potential Assistance Purchasing Medications No   Type of Home Care Services None   Services At/After Discharge   Transition of Care Consult (CM Consult) N/A   Services At/After Discharge Hospice   Mode of Transport at Discharge BLS   Condition of Participation: Discharge Planning   The Patient and/or Patient Representative was provided with a Choice of Provider? Patient Representative   Name of the Patient Representative who was provided with the Choice of Provider and agrees with the Discharge Plan?  Alba Robertson   The Patient and/Or Patient Representative agree with the Discharge Plan? Yes   Freedom of Choice list was provided with basic dialogue that supports the patient's individualized plan of care/goals, treatment preferences, and shares the quality data  associated with the providers?  Yes     Discharge planning was discussed with the attending MD and primary nurse.    RN ZION met with the patient and daughter Alba and discussed hospice services including hospice houses and home hospice. Alba was provided with a list of hospice agencies. She selected [facility]. A referral was sent to the hospice agency and is pending review.

## 2024-04-24 NOTE — OP NOTE
Operative Note      Patient: Alexys Zamora  YOB: 1935  MRN: [id number]    Date of Procedure:     Thoracentesis on L    Pleural effusion on L  Post-Op Diagnosis:  Grossly consistent with empyema           * Surgery not found *    Assistant:   * Surgery not found *    Anesthesia: * No surgery found *    Estimated Blood Loss (mL): Minimal    Complications: None    Specimens:   * Cannot find log *    Implants:  * No surgical log found *      Drains:   External Urinary Catheter (Active)   Site Assessment Clean,dry & intact 04/24/24 4203       Findings:  Infection Present At Time Of Surgery (PATOS) (choose all levels that have infection present):  - Deep Infection (muscle/fascia) present as evidenced by fluid consistent with infection      Detailed Description of Procedure:   PROCEDURE:    DIAGNOSTIC/THERAPEUTIC THORACENTESIS        PRE-OP DIAGNOSIS:    L PLEURAL EFFUSION    POST-OP DIAGNOSIS:    L PLEURAL EFFUSION likely empyema    ASSISTANT:    None    ANESTHESIA:    LOCAL ANESTHESIA WITH 1% LIDOCAINE 10 CC TOTAL.      CHEST ULTRASOUND FINDINGS:    A Turbo-M, Sonosite ultrasound with a 5-16 mHz probe was used to image the chest and localize the pleural effusion on the Left chest.      A moderate/  complex pleural space was identified on ultrasound, there were multiple loculations and septetions present,         DESCRIPTION OF PROCEDURE:    After obtaining informed consent and localizing the safest location for thoracentesis, the  9th intercostal space was marked with a blunt, plastic needle cap in the mid scapular line.    [id number] Pl[facility]-Seal thoracentesis kit was used to perform the procedure.    The skin was  cleansed with the supplied  chlorhexididne swab and then draped in the usual fasion.    Using the previously marked location as a giude, a 22 G 1.5 inch needle was used to inject 10 cc of 1% lidocaine into the skin and subcutaneous tissue, as well as onto the underlying rib and

## 2024-04-25 LAB — VIT B12 SERPL-MCNC: 3983 PG/ML (ref 193–986)

## 2024-04-25 PROCEDURE — 99232 SBSQ HOSP IP/OBS MODERATE 35: CPT | Performed by: INTERNAL MEDICINE

## 2024-04-25 PROCEDURE — 94760 N-INVAS EAR/PLS OXIMETRY 1: CPT

## 2024-04-25 PROCEDURE — 1100000000 HC RM PRIVATE

## 2024-04-25 PROCEDURE — 6360000002 HC RX W HCPCS: Performed by: INTERNAL MEDICINE

## 2024-04-25 PROCEDURE — 2580000003 HC RX 258: Performed by: INTERNAL MEDICINE

## 2024-04-25 PROCEDURE — 2700000000 HC OXYGEN THERAPY PER DAY

## 2024-04-25 PROCEDURE — 94761 N-INVAS EAR/PLS OXIMETRY MLT: CPT

## 2024-04-25 PROCEDURE — 6370000000 HC RX 637 (ALT 250 FOR IP): Performed by: INTERNAL MEDICINE

## 2024-04-25 RX ORDER — MORPHINE SULFATE 2 MG/ML
2 INJECTION, SOLUTION INTRAMUSCULAR; INTRAVENOUS
Status: DISCONTINUED | OUTPATIENT
Start: 2024-04-25 | End: 2024-04-26 | Stop reason: HOSPADM

## 2024-04-25 RX ORDER — MORPHINE SULFATE 4 MG/ML
4 INJECTION, SOLUTION INTRAMUSCULAR; INTRAVENOUS
Status: DISCONTINUED | OUTPATIENT
Start: 2024-04-25 | End: 2024-04-26 | Stop reason: HOSPADM

## 2024-04-25 RX ADMIN — MORPHINE SULFATE 2 MG: 2 INJECTION, SOLUTION INTRAMUSCULAR; INTRAVENOUS at 12:35

## 2024-04-25 RX ADMIN — SODIUM CHLORIDE, PRESERVATIVE FREE 10 ML: 5 INJECTION INTRAVENOUS at 08:48

## 2024-04-25 RX ADMIN — GLYCOPYRROLATE 0.2 MG: 0.2 INJECTION INTRAMUSCULAR; INTRAVENOUS at 10:49

## 2024-04-25 ASSESSMENT — PAIN SCALES - WONG BAKER: WONGBAKER_NUMERICALRESPONSE: NO HURT

## 2024-04-25 NOTE — PROGRESS NOTES
Alexys Zamora  Admission Date: 4/23/2024         Daily Progress Note: 4/25/2024    The patient's chart is reviewed and the patient is discussed with the staff.    Background:     Patient is a 88 y.o.  female seen and evaluated at the request of Dr. Rahman.  Alexys Zamora is a 88 y.o. female with medical history of dementia currently in memory care unit, wheelchair-bound.  Was brought to the ER with hypoxia.  It was associated with cough, congestion.  They were unable to wake her up in the morning.  Patient was not following commands and unable to provide history.  History was obtained from the daughter at the bedside.  She stated she got a call from the memory care unit stating that they are unable to pick her up.  Of note, patient was recently admitted in the hospital for subdural hematoma and she was declining since then.     ED course afebrile, /58, saturating 88% on room air.  Labs significant for leukocytosis of 25 K, K 3.2, CO2 17, BUN/creatinine 49/1.01, LA 1.3, blood glucose 97, anion gap 22, procalcitonin 4.3.  Urine analysis negative.  CTA chest showed bilateral subdural hematomas and right to left midline shift.  Chest x-ray showed left pleural effusion.  Admitted for sepsis from bacterial PNA.  We are asked to assess for pleural effusion  +metapneumovirus underlying.     Unable to obtain any history from patient, per nursing family leaning towards hospice care     Blood culture is positive for streptococcus pneumonia    Subjective:     Patient currently on O2 at 2 L/min with O2 sats of 95%.  Her daughter Alba at the bedside and concerned about her overall decline over the last several months.  Patient has some oral secretion causing dry cough and she was started on Robinul.    Current Facility-Administered Medications   Medication Dose Route Frequency    tuberculin injection 5 Units  5 Units IntraDERmal Once    morphine 20MG/ML concentrated solution 5 mg  5 mg Oral Q2H      Constitutional:  the patient is well developed and in no acute distress  EENMT:  Sclera clear, pupils equal, oral mucosa moist  Respiratory: symmetric chest rise. Decreased BS om L base  Cardiovascular:  RRR without M,G,R. There is trace lower extremity edema.  Gastrointestinal: soft and non-tender; with positive bowel sounds.  Musculoskeletal: warm without cyanosis. Normal muscle tone.   Skin:  no jaundice or rashes, no wounds   Neurologic: symmetric strength  Psychiatric:  calm    Imaging:   I performed an independent interpretation of the patient's images.  CXR:       CT Chest:         LAB:  Recent Labs     04/23/24  1320 04/24/24  0317   WBC 25.8* 31.0*   HGB 10.5* 9.9*   HCT 31.8* 29.8*    366   PROCAL 4.43*  --      Recent Labs     04/23/24  1320 04/24/24  0317    147*   K 3.2* 3.2*    110*   CO2 17* 18*   BUN 49* 41*   CREATININE 1.01 0.96   MG  --  2.6*   PHOS  --  5.2*   BILITOT 0.8 0.5   AST 30 37   ALT 11* 14   ALKPHOS 223* 210*     Recent Labs     04/23/24  1320   NTPROBNP 6,033*     Recent Labs     04/23/24  1320 04/24/24  0317   GLUCOSE 97 126*      Microbiology:   Recent Labs     04/23/24  1320 04/23/24  1358   CULTURE CULTURE IN PROGRESS,FURTHER UPDATES TO FOLLOW  Refer to Blood Culture ID Panel Accession [id number] CULTURE IN PROGRESS,FURTHER UPDATES TO FOLLOW     Recent Labs     04/23/24  1921   COVID19 NOT DETECTED     ECHO: 04/23/24    ECHO (TTE) COMPLETE (PRN CONTRAST/BUBBLE/STRAIN/3D) 04/24/2024  3:58 PM (Final)    Interpretation Summary    Left Ventricle: Normal left ventricular systolic function with a visually estimated EF of 55 - 60%. Left ventricle size is normal. Normal wall thickness. Normal wall motion. Diastolic dysfunction present with normal LV EF.    Tricuspid Valve: Mildly elevated RVSP, consistent with mild pulmonary hypertension. The estimated RVSP is 39 mmHg.    Image quality is adequate. Contrast used: Definity. Procedure performed with the patient in a

## 2024-04-25 NOTE — PROGRESS NOTES
Pt re evaluated this morning by [facility] liaison.  Pt still not meeting criteria for GIP placement at [facility].  Asked daughter about ability to take pt home with routine hospice care but she said she was unable to care for her mother. Pt was approved for Domiciliary stay at [facility].  Met with daughter and explained domiciliary offer to her.  She declined offer and stated that she wanted to wait until tomorrow and see how she was then.  Updated primary RN, ZION Asif.  Instructed everyone to call liasion if situation changed.

## 2024-04-25 NOTE — CARE COORDINATION
The patient transferred to a different unit. Handoff report was given to the patient's assigned  for today.

## 2024-04-25 NOTE — CARE COORDINATION
SHAHZAD CM notified by RN Supervisor and [facility] liaison family would like hospice referral sent to [facility].     SHAHZAD contacted [facility] and sent hospice referral to [phone] for facility to review. Maryjane with [facility] reported [place] liaison Stefanie to be notified to evaluate pt for [facility].     SW to continue to follow for any needs that may arise.

## 2024-04-25 NOTE — PROGRESS NOTES
Vancomycin Level, Random    Collection Time: 04/24/24  3:17 AM   Result Value Ref Range    Vancomycin Rm 8.3 UG/ML   Magnesium    Collection Time: 04/24/24  3:17 AM   Result Value Ref Range    Magnesium 2.6 (H) 1.8 - 2.4 mg/dL   Arterial Blood Gas, POC    Collection Time: 04/24/24  3:31 AM   Result Value Ref Range    DEVICE NASAL CANNULA      FIO2 50 %    POC pH 7.47 (H) 7.35 - 7.45      POC pCO2 26.8 (L) 35 - 45 MMHG    POC PO2 76 75 - 100 MMHG    POC HCO3 19.3 (L) 22 - 26 MMOL/L    POC O2 SAT 96.1 95 - 98 %    Base Deficit (POC) 3.4 mmol/L    POC Raoul's Test Positive      Site LEFT RADIAL      Specimen type: ARTERIAL      Performed by: Jenni    POCT Glucose    Collection Time: 04/24/24  7:41 AM   Result Value Ref Range    POC Glucose 128 (H) 65 - 100 mg/dL    Performed by: Dawit    pH, Body Fluid    Collection Time: 04/24/24  9:20 AM   Result Value Ref Range    Fluid Type PLEURAL FLUID      pH, Fluid 6.0     Echo (TTE) complete (PRN contrast/bubble/strain/3D)    Collection Time: 04/24/24 11:05 AM   Result Value Ref Range    LV EDV A2C 69 mL    LV EDV A4C 75 mL    LV ESV A2C 29 mL    LV ESV A4C 32 mL    LVIDd 4.1 3.9 - 5.3 cm    LVIDs 2.9 cm    LVOT Diameter 1.8 cm    LVOT Mean Gradient 1 mmHg    LVOT VTI 15.7 cm    LVOT Peak Velocity 0.7 m/s    LVOT Peak Gradient 2 mmHg    LV E' Lateral Velocity 5 cm/s    LV E' Septal Velocity 5 cm/s    LV Ejection Fraction A2C 59 %    LV Ejection Fraction A4C 58 %    EF BP 59 55 - 100 %    LVOT Area 2.5 cm2    LVOT SV 39.9 ml    LA Minor Axis 4.3 cm    LA Major [place] 5.6 cm    LA Area 2C 12.5 cm2    LA Area 4C 14.7 cm2    LA Volume MOD A2C 30 22 - 52 mL    LA Volume MOD A4C 31 22 - 52 mL    LA Diameter 2.9 cm    AV Mean Velocity 0.9 m/s    AV Mean Gradient 4 mmHg    AV VTI 27.5 cm    AV Peak Velocity 1.3 m/s    AV Peak Gradient 6 mmHg    AV Area by VTI 1.5 cm2    AV Area by Peak Velocity 1.4 cm2    Aortic Root 2.8 cm    Ascending Aorta 2.6 cm    IVC  Expiration 1.7 cm    MV E Wave Deceleration Time 221.0 ms    MV A Velocity 0.93 m/s    MV E Velocity 1.01 m/s    MV Mean Velocity 0.7 m/s    MV Mean Gradient 2 mmHg    MV VTI 23.7 cm    MV Max Velocity 1.0 m/s    MV Peak Gradient 4 mmHg    MV Area by VTI 1.7 cm2    PV AT 69.0 ms    PV Max Velocity 1.1 m/s    PV Peak Gradient 5 mmHg    RV Free Wall Peak S' 10 cm/s    TAPSE 2.0 1.7 cm    TR Max Velocity 3.02 m/s    TR Peak Gradient 36 mmHg    Body Surface Area 1.35 m2    Fractional Shortening 2D 29 28 - 44 %    LV ESV Index A4C 24 mL/m2    LV EDV Index A4C 56 mL/m2    LV ESV Index A2C 22 mL/m2    LV EDV Index A2C 52 mL/m2    LVIDd Index 3.08 cm/m2    LVIDs Index 2.18 cm/m2    MV E/A 1.09     E/E' Ratio (Averaged) 20.20     E/E' Lateral 20.20     LVOT Stroke Volume Index 30.0 mL/m2    LA Volume Index MOD A2C 23 16 - 34 ml/m2    LA Volume Index MOD A4C 23 16 - 34 ml/m2    LA Size Index 2.18 cm/m2    LA/AO Root Ratio 1.04     Ao Root Index 2.11 cm/m2    Ascending Aorta Index 1.95 cm/m2    AV Velocity Ratio 0.54     LVOT:AV VTI Index 0.57     BLANK/BSA VTI 1.1 cm2/m2    BLANK/BSA Peak Velocity 1.1 cm2/m2    MV:LVOT VTI Index 1.51     Est. RA Pressure 3 mmHg    RVSP 39 mmHg       Recent Labs     04/23/24  1921   COVID19 NOT DETECTED         Current Meds:  Current Facility-Administered Medications   Medication Dose Route Frequency    morphine 20MG/ML concentrated solution 5 mg  5 mg Oral Q2H PRN    morphine (PF) injection 2 mg  2 mg IntraVENous Q2H PRN    Or    morphine sulfate (PF) injection 4 mg  4 mg IntraVENous Q2H PRN    glycopyrrolate (ROBINUL) injection 0.2 mg  0.2 mg IntraVENous Q4H PRN    bisacodyl (DULCOLAX) suppository 10 mg  10 mg Rectal Daily PRN    loperamide (IMODIUM) capsule 2 mg  2 mg Oral PRN    LORazepam (ATIVAN) 1 mg in sodium chloride (PF) 0.9 % 10 mL injection  1 mg IntraVENous Q6H PRN    sodium chloride flush 0.9 % injection 5-40 mL  5-40 mL IntraVENous 2 times per day    sodium chloride flush 0.9 %

## 2024-04-26 VITALS
BODY MASS INDEX: 17.71 KG/M2 | WEIGHT: 90.2 LBS | DIASTOLIC BLOOD PRESSURE: 58 MMHG | SYSTOLIC BLOOD PRESSURE: 141 MMHG | HEIGHT: 60 IN | TEMPERATURE: 97.7 F | RESPIRATION RATE: 22 BRPM | OXYGEN SATURATION: 95 % | HEART RATE: 84 BPM

## 2024-04-26 PROBLEM — Z51.5 COMFORT MEASURES ONLY STATUS: Status: ACTIVE | Noted: 2024-04-26

## 2024-04-26 PROBLEM — Z51.5 ENCOUNTER FOR PALLIATIVE CARE: Status: ACTIVE | Noted: 2024-04-26

## 2024-04-26 PROBLEM — J96.01 ACUTE RESPIRATORY FAILURE WITH HYPOXIA (HCC): Status: ACTIVE | Noted: 2024-04-23

## 2024-04-26 LAB
BACTERIA SPEC CULT: ABNORMAL
GRAM STN SPEC: ABNORMAL
MM INDURATION, POC: 0 MM (ref 0–5)
PPD, POC: NEGATIVE
SARS-COV-2 RDRP RESP QL NAA+PROBE: NOT DETECTED
SERVICE CMNT-IMP: ABNORMAL
SERVICE CMNT-IMP: ABNORMAL
SOURCE: NORMAL

## 2024-04-26 PROCEDURE — 6360000002 HC RX W HCPCS: Performed by: INTERNAL MEDICINE

## 2024-04-26 PROCEDURE — 87635 SARS-COV-2 COVID-19 AMP PRB: CPT

## 2024-04-26 PROCEDURE — 2580000003 HC RX 258: Performed by: INTERNAL MEDICINE

## 2024-04-26 RX ADMIN — GLYCOPYRROLATE 0.2 MG: 0.2 INJECTION INTRAMUSCULAR; INTRAVENOUS at 12:21

## 2024-04-26 RX ADMIN — SODIUM CHLORIDE, PRESERVATIVE FREE 10 ML: 5 INJECTION INTRAVENOUS at 08:17

## 2024-04-26 RX ADMIN — GLYCOPYRROLATE 0.2 MG: 0.2 INJECTION INTRAMUSCULAR; INTRAVENOUS at 08:16

## 2024-04-26 RX ADMIN — MORPHINE SULFATE 2 MG: 2 INJECTION, SOLUTION INTRAMUSCULAR; INTRAVENOUS at 13:16

## 2024-04-26 NOTE — CARE COORDINATION
Patient is for discharge to [facility] today via [employer] transportation. Transport via [employer] around 1400.  Packet prepared to go with pt to facility.  Family and liaison updated on anticipated transport time.           04/26/24 1132   Social/Functional History   Lives With Daughter   Type of Home Assisted living   Bathroom Equipment None   Home Equipment Rollator;Wheelchair-manual;Cane;Walker, rolling   ADL Assistance Needs assistance   Services At/After Discharge   Transition of Care Consult (CM Consult) [facility] [place]   Internal Hospice No   Reason Outside Agency Chosen Script used patient chose alternate agency   Services At/After Discharge Transport;In ambulance;Hospice   [place] Resource Information Provided? No   Mode of Transport at Discharge [facility] Transport Time of Discharge 1400   Confirm Follow Up Transport Other (see comment)  ([employer])   Condition of Participation: Discharge Planning   The Plan for Transition of Care is related to the following treatment goals: Patient to discharge to [facility].   The Patient and/or Patient Representative was provided with a Choice of Provider? Patient   Name of the Patient Representative who was provided with the Choice of Provider and agrees with the Discharge Plan?  Ghada   The Patient and/Or Patient Representative agree with the Discharge Plan? Yes   Freedom of Choice list was provided with basic dialogue that supports the patient's individualized plan of care/goals, treatment preferences, and shares the quality data associated with the providers?  Yes       Jeaneth Mcgregor, MSW, LBSW    [facility]

## 2024-04-26 NOTE — PLAN OF CARE
Problem: Safety - Adult  Goal: Free from fall injury  Outcome: Not Progressing     Problem: Discharge Planning  Goal: Discharge to home or other facility with appropriate resources  Outcome: Not Progressing     Problem: Pain  Goal: Verbalizes/displays adequate comfort level or baseline comfort level  Outcome: Not Progressing     Problem: Skin/Tissue Integrity  Goal: Absence of new skin breakdown  Description: 1.  Monitor for areas of redness and/or skin breakdown  2.  Assess vascular access sites hourly  3.  Every 4-6 hours minimum:  Change oxygen saturation probe site  4.  Every 4-6 hours:  If on nasal continuous positive airway pressure, respiratory therapy assess nares and determine need for appliance change or resting period.  Outcome: Not Progressing     Problem: Safety - Adult  Goal: Free from fall injury  Outcome: Not Progressing     Problem: Discharge Planning  Goal: Discharge to home or other facility with appropriate resources  Outcome: Not Progressing     Problem: Pain  Goal: Verbalizes/displays adequate comfort level or baseline comfort level  Outcome: Not Progressing     Problem: Skin/Tissue Integrity  Goal: Absence of new skin breakdown  Description: 1.  Monitor for areas of redness and/or skin breakdown  2.  Assess vascular access sites hourly  3.  Every 4-6 hours minimum:  Change oxygen saturation probe site  4.  Every 4-6 hours:  If on nasal continuous positive airway pressure, respiratory therapy assess nares and determine need for appliance change or resting period.  Outcome: Not Progressing

## 2024-04-26 NOTE — DISCHARGE SUMMARY
Hospitalist Discharge Summary   Admit Date:  2024  1:11 PM   DC Note date: 2024  Name:  Alexys Zamora   Age:  88 y.o.  Sex:  female  :  1935   MRN:  [id number]   Room:  [facility]  PCP:  Heena Rasmussen MD    Presenting Complaint: Fatigue     Initial Admission Diagnosis: Dehydration [E86.0]  Respiratory failure (HCC) [J96.90]  Metabolic acidosis [E87.20]  Sepsis due to pneumonia (HCC) [J18.9, A41.9]  Acute metabolic encephalopathy [G93.41]     Problem List for this Hospitalization (present on admission):    Principal Problem:    Empyema lung (HCC)  Active Problems:    Pure hypercholesterolemia    S/P prosthetic total arthroplasty of the hip    Essential hypertension with goal blood pressure less than 140/90    Osteoarthritis of hip    Reflux esophagitis    Subdural hematoma (HCC)    Dementia with behavioral disturbance (HCC)    Pleural effusion on left    Acute respiratory failure with hypoxia (HCC)    Sepsis (HCC)    Bacterial pneumonia    Encounter for palliative care    Comfort measures only status  Resolved Problems:    * No resolved hospital problems. *      Hospital Course:  Alexys Zamora is a 88 y.o. female with medical history of dementia currently in memory care unit, wheelchair-bound.  Was brought to the ER with hypoxia.  It was associated with cough, congestion.  They were unable to wake her up in the morning.  Patient was not following commands and unable to provide history.  History was obtained from the daughter at the bedside.  She stated she got a call from the memory care unit stating that they are unable to pick her up.  Of note, patient was recently admitted in the hospital for subdural hematoma and she was declining since then.     ED course afebrile, /58, saturating 88% on room air.  Labs significant for leukocytosis of 25 K, K 3.2, CO2 17, BUN/creatinine 49/1.01, LA 1.3, blood glucose 97, anion gap 22, procalcitonin 4.3.  Urine analysis negative.  CTA chest showed  -- 94 %   04/25/24 1932 99 °F (37.2 °C) 85 18 (!) 118/94 94 %       Oxygen Therapy  SpO2: 95 %  Pulse via Oximetry: 80 beats per minute  Pulse Oximeter Device Mode: Intermittent  Pulse Oximeter Device Location: Forehead  O2 Device: None (Room air)  Skin Assessment: Clean, dry, & intact  Skin Protection for O2 Device: No  FiO2 : 60 %  O2 Flow Rate (L/min): 2 L/min  Blood Gas  Performed?: Yes  Raoul's Test #1: Collateral flow confirmed  Site #1: Left Radial  Site Prepped #1: Yes  Number of Attempts #1: 1  Pressure Held #1: Yes  Complications #1: Hematoma  Post-procedure #1: None  Specimen Status #1: Point of care  How Tolerated?: Tolerated well  Oxygen Therapy: Supplemental oxygen    Estimated body mass index is 17.62 kg/m² as calculated from the following:    Height as of this encounter: 1.524 m (5').    Weight as of this encounter: 40.9 kg (90 lb 3.2 oz).    Intake/Output Summary (Last 24 hours) at 4/26/2024 1115  Last data filed at 4/26/2024 0614  Gross per 24 hour   Intake --   Output 200 ml   Net -200 ml         Physical Exam:  See progress note today    Signed:  Alban Rahman MD    Part of this note may have been written by using a voice dictation software.  The note has been proof read but may still contain some grammatical/other typographical errors.

## 2024-04-26 NOTE — PROGRESS NOTES
Hospitalist Progress Note   Admit Date:  2024  1:11 PM   Name:  Alexys Zamora   Age:  88 y.o.  Sex:  female  :  1935   MRN:  [id number]   Room:  [facility]    Presenting/Chief Complaint: Fatigue     Reason(s) for Admission: Dehydration [E86.0]  Respiratory failure (HCC) [J96.90]  Metabolic acidosis [E87.20]  Sepsis due to pneumonia (HCC) [J18.9, A41.9]  Acute metabolic encephalopathy [G93.41]     Hospital Course:   Alexys Zamora is a 88 y.o. female with medical history of dementia currently in memory care unit, wheelchair-bound.  Was brought to the ER with hypoxia.  It was associated with cough, congestion.  They were unable to wake her up in the morning.  Patient was not following commands and unable to provide history.  History was obtained from the daughter at the bedside.  She stated she got a call from the memory care unit stating that they are unable to pick her up.  Of note, patient was recently admitted in the hospital for subdural hematoma and she was declining since then.     ED course afebrile, /58, saturating 88% on room air.  Labs significant for leukocytosis of 25 K, K 3.2, CO2 17, BUN/creatinine 49/1.01, LA 1.3, blood glucose 97, anion gap 22, procalcitonin 4.3.  Urine analysis negative.  CTA chest showed bilateral subdural hematomas and right to left midline shift.  Chest x-ray showed left pleural effusion.  Admitted for sepsis from bacterial PNA.  +metapneumovirus underlying.      Subjective & 24hr Events:   No family present currently.  Cannot obtain history from patient due to lethargy.    Daughter yesterday requested IV morphine for dyspnea and this was ordered      Assessment & Plan:     Sepsis due to bacterial PNA with parapneumonic effusion and metapneumovirus   -comfort measures initiated and hospice following.  -IV morphine and Ativan as needed for dyspnea  -IV Robinul for secretions     Acute hypoxic respiratory failure in the setting of pneumonia with pleural  Supplemental oxygen    Estimated body mass index is 17.62 kg/m² as calculated from the following:    Height as of this encounter: 1.524 m (5').    Weight as of this encounter: 40.9 kg (90 lb 3.2 oz).    Intake/Output Summary (Last 24 hours) at 4/26/2024 0935  Last data filed at 4/26/2024 0614  Gross per 24 hour   Intake --   Output 200 ml   Net -200 ml           Physical Exam:     General:    Frail and ill appearing  Head:  Normocephalic, atraumatic  Eyes:  Sclerae appear normal.  Pupils equally round.  Lungs:   ND    Symmetric expansion.  Abdomen:   nondistended.  Extremities: No edema  Skin:     No rashes.  Normal coloration.       I have personally reviewed labs and tests:  Recent Labs:  Recent Results (from the past 48 hour(s))   Echo (TTE) complete (PRN contrast/bubble/strain/3D)    Collection Time: 04/24/24 11:05 AM   Result Value Ref Range    LV EDV A2C 69 mL    LV EDV A4C 75 mL    LV ESV A2C 29 mL    LV ESV A4C 32 mL    LVIDd 4.1 3.9 - 5.3 cm    LVIDs 2.9 cm    LVOT Diameter 1.8 cm    LVOT Mean Gradient 1 mmHg    LVOT VTI 15.7 cm    LVOT Peak Velocity 0.7 m/s    LVOT Peak Gradient 2 mmHg    LV E' Lateral Velocity 5 cm/s    LV E' Septal Velocity 5 cm/s    LV Ejection Fraction A2C 59 %    LV Ejection Fraction A4C 58 %    EF BP 59 55 - 100 %    LVOT Area 2.5 cm2    LVOT SV 39.9 ml    LA Minor Axis 4.3 cm    LA Major [place] 5.6 cm    LA Area 2C 12.5 cm2    LA Area 4C 14.7 cm2    LA Volume MOD A2C 30 22 - 52 mL    LA Volume MOD A4C 31 22 - 52 mL    LA Diameter 2.9 cm    AV Mean Velocity 0.9 m/s    AV Mean Gradient 4 mmHg    AV VTI 27.5 cm    AV Peak Velocity 1.3 m/s    AV Peak Gradient 6 mmHg    AV Area by VTI 1.5 cm2    AV Area by Peak Velocity 1.4 cm2    Aortic Root 2.8 cm    Ascending Aorta 2.6 cm    IVC Expiration 1.7 cm    MV E Wave Deceleration Time 221.0 ms    MV A Velocity 0.93 m/s    MV E Velocity 1.01 m/s    MV Mean Velocity 0.7 m/s    MV Mean Gradient 2 mmHg    MV VTI 23.7 cm    MV Max Velocity 1.0 m/s

## 2024-04-27 LAB
BACTERIA SPEC CULT: ABNORMAL
GLUCOSE FLD-MCNC: <2 MG/DL
GRAM STN SPEC: ABNORMAL
GRAM STN SPEC: ABNORMAL
LDH FLD L TO P-CCNC: 1618 U/L
PROT FLD-MCNC: 4.2 G/DL
SERVICE CMNT-IMP: ABNORMAL
SPECIMEN SOURCE FLD: NORMAL

## 2024-05-28 LAB
APPEARANCE FLD: NORMAL
COLOR FLD: YELLOW
NUC CELL # FLD: NORMAL /CU MM
RBC # FLD: 1000 /CU MM
SPECIMEN SOURCE FLD: NORMAL